# Patient Record
Sex: FEMALE | Race: BLACK OR AFRICAN AMERICAN | NOT HISPANIC OR LATINO | Employment: OTHER | URBAN - METROPOLITAN AREA
[De-identification: names, ages, dates, MRNs, and addresses within clinical notes are randomized per-mention and may not be internally consistent; named-entity substitution may affect disease eponyms.]

---

## 2023-04-11 ENCOUNTER — OFFICE VISIT (OUTPATIENT)
Dept: PRIMARY CARE | Facility: CLINIC | Age: 83
End: 2023-04-11
Payer: COMMERCIAL

## 2023-04-11 VITALS
BODY MASS INDEX: 47.75 KG/M2 | TEMPERATURE: 98.4 F | HEART RATE: 70 BPM | SYSTOLIC BLOOD PRESSURE: 130 MMHG | RESPIRATION RATE: 16 BRPM | DIASTOLIC BLOOD PRESSURE: 70 MMHG | WEIGHT: 278.2 LBS

## 2023-04-11 DIAGNOSIS — I10 PRIMARY HYPERTENSION: ICD-10-CM

## 2023-04-11 DIAGNOSIS — M1A.00X0 IDIOPATHIC CHRONIC GOUT WITHOUT TOPHUS, UNSPECIFIED SITE: ICD-10-CM

## 2023-04-11 DIAGNOSIS — C50.912 MALIGNANT NEOPLASM OF LEFT FEMALE BREAST, UNSPECIFIED ESTROGEN RECEPTOR STATUS, UNSPECIFIED SITE OF BREAST (MULTI): ICD-10-CM

## 2023-04-11 DIAGNOSIS — C73 PAPILLARY THYROID CARCINOMA (MULTI): ICD-10-CM

## 2023-04-11 DIAGNOSIS — E89.0 POSTOPERATIVE HYPOTHYROIDISM: ICD-10-CM

## 2023-04-11 DIAGNOSIS — N18.32 STAGE 3B CHRONIC KIDNEY DISEASE (MULTI): ICD-10-CM

## 2023-04-11 DIAGNOSIS — E78.5 HYPERLIPIDEMIA, UNSPECIFIED HYPERLIPIDEMIA TYPE: ICD-10-CM

## 2023-04-11 DIAGNOSIS — D32.9 MENINGIOMA (MULTI): Primary | ICD-10-CM

## 2023-04-11 PROBLEM — M17.11 PRIMARY OSTEOARTHRITIS OF RIGHT KNEE: Status: ACTIVE | Noted: 2023-04-11

## 2023-04-11 PROBLEM — M54.12 CERVICAL RADICULOPATHY: Status: ACTIVE | Noted: 2023-04-11

## 2023-04-11 PROBLEM — E53.8 VITAMIN B12 DEFICIENCY: Status: ACTIVE | Noted: 2023-04-11

## 2023-04-11 PROBLEM — M19.90 OSTEOARTHRITIS: Status: ACTIVE | Noted: 2023-04-11

## 2023-04-11 PROBLEM — G44.89 OTHER HEADACHE SYNDROME: Status: ACTIVE | Noted: 2023-04-11

## 2023-04-11 PROBLEM — M25.562 PAIN IN BOTH KNEES: Status: ACTIVE | Noted: 2023-04-11

## 2023-04-11 PROBLEM — Z85.3 PERSONAL HISTORY OF MALIGNANT NEOPLASM OF BREAST: Status: ACTIVE | Noted: 2023-04-11

## 2023-04-11 PROBLEM — R51.9 UNILATERAL HEADACHE: Status: ACTIVE | Noted: 2023-04-11

## 2023-04-11 PROBLEM — E03.9 HYPOTHYROIDISM: Status: ACTIVE | Noted: 2023-04-11

## 2023-04-11 PROBLEM — R74.8 ELEVATED ALKALINE PHOSPHATASE LEVEL: Status: ACTIVE | Noted: 2023-04-11

## 2023-04-11 PROBLEM — G47.00 INSOMNIA: Status: ACTIVE | Noted: 2023-04-11

## 2023-04-11 PROBLEM — R42 VERTIGO: Status: ACTIVE | Noted: 2023-04-11

## 2023-04-11 PROBLEM — K59.00 CONSTIPATION: Status: ACTIVE | Noted: 2023-04-11

## 2023-04-11 PROBLEM — E88.09 HYPERPROTEINEMIA: Status: ACTIVE | Noted: 2023-04-11

## 2023-04-11 PROBLEM — E55.9 VITAMIN D DEFICIENCY: Status: ACTIVE | Noted: 2023-04-11

## 2023-04-11 PROBLEM — M10.9 GOUT: Status: ACTIVE | Noted: 2023-04-11

## 2023-04-11 PROBLEM — G90.01 CAROTIDYNIA: Status: ACTIVE | Noted: 2023-04-11

## 2023-04-11 PROBLEM — M19.032 ARTHRITIS OF WRIST, LEFT: Status: ACTIVE | Noted: 2023-04-11

## 2023-04-11 PROBLEM — J30.9 ALLERGIC SINUSITIS: Status: ACTIVE | Noted: 2023-04-11

## 2023-04-11 PROBLEM — H61.21 IMPACTED CERUMEN OF RIGHT EAR: Status: ACTIVE | Noted: 2023-04-11

## 2023-04-11 PROBLEM — K76.89 LIVER CYST: Status: ACTIVE | Noted: 2023-04-11

## 2023-04-11 PROBLEM — C50.919 BREAST CANCER (MULTI): Status: ACTIVE | Noted: 2023-04-11

## 2023-04-11 PROBLEM — R04.0 EPISTAXIS, RECURRENT: Status: ACTIVE | Noted: 2023-04-11

## 2023-04-11 PROBLEM — R60.0 PEDAL EDEMA: Status: ACTIVE | Noted: 2023-04-11

## 2023-04-11 PROBLEM — M25.561 PAIN IN BOTH KNEES: Status: ACTIVE | Noted: 2023-04-11

## 2023-04-11 PROBLEM — G56.00 CARPAL TUNNEL SYNDROME: Status: ACTIVE | Noted: 2023-04-11

## 2023-04-11 PROBLEM — K76.0 FATTY LIVER: Status: ACTIVE | Noted: 2023-04-11

## 2023-04-11 PROBLEM — L30.9 ECZEMA: Status: ACTIVE | Noted: 2023-04-11

## 2023-04-11 PROBLEM — N18.30 CKD (CHRONIC KIDNEY DISEASE), STAGE III (MULTI): Status: ACTIVE | Noted: 2023-04-11

## 2023-04-11 LAB
POC HDL CHOLESTEROL: 100 MG/DL (ref 0–50)
POC LDL CHOLESTEROL: 0 MG/DL (ref 0–100)
POC NON-HDL CHOLESTEROL: 0 MG/DL (ref 0–130)
POC TOTAL CHOLESTEROL/HDL RATIO: 0 (ref 0–4.5)
POC TOTAL CHOLESTEROL: 212 MG/DL (ref 0–199)
POC TRIGLYCERIDES: 469 MG/DL (ref 0–150)

## 2023-04-11 PROCEDURE — 80061 LIPID PANEL: CPT | Performed by: INTERNAL MEDICINE

## 2023-04-11 PROCEDURE — 1036F TOBACCO NON-USER: CPT | Performed by: INTERNAL MEDICINE

## 2023-04-11 PROCEDURE — 99214 OFFICE O/P EST MOD 30 MIN: CPT | Performed by: INTERNAL MEDICINE

## 2023-04-11 PROCEDURE — 3078F DIAST BP <80 MM HG: CPT | Performed by: INTERNAL MEDICINE

## 2023-04-11 PROCEDURE — 1160F RVW MEDS BY RX/DR IN RCRD: CPT | Performed by: INTERNAL MEDICINE

## 2023-04-11 PROCEDURE — 1159F MED LIST DOCD IN RCRD: CPT | Performed by: INTERNAL MEDICINE

## 2023-04-11 PROCEDURE — 3075F SYST BP GE 130 - 139MM HG: CPT | Performed by: INTERNAL MEDICINE

## 2023-04-11 RX ORDER — AMLODIPINE BESYLATE 10 MG/1
10 TABLET ORAL DAILY
COMMUNITY
End: 2023-09-06 | Stop reason: SDUPTHER

## 2023-04-11 RX ORDER — LEVOTHYROXINE SODIUM 125 UG/1
125 TABLET ORAL DAILY
COMMUNITY
End: 2023-09-05 | Stop reason: SDUPTHER

## 2023-04-11 RX ORDER — MECLIZINE HYDROCHLORIDE 25 MG/1
25 TABLET ORAL 3 TIMES DAILY PRN
COMMUNITY
Start: 2020-02-21 | End: 2024-02-15 | Stop reason: SDUPTHER

## 2023-04-11 RX ORDER — CHOLECALCIFEROL (VITAMIN D3) 50 MCG
1 TABLET ORAL DAILY
COMMUNITY
Start: 2016-04-25

## 2023-04-11 RX ORDER — ROSUVASTATIN CALCIUM 10 MG/1
10 TABLET, COATED ORAL DAILY
COMMUNITY
End: 2023-09-05 | Stop reason: SDUPTHER

## 2023-04-11 RX ORDER — TRIAMTERENE AND HYDROCHLOROTHIAZIDE 75; 50 MG/1; MG/1
1 TABLET ORAL DAILY
COMMUNITY
End: 2023-09-05 | Stop reason: SDUPTHER

## 2023-04-11 RX ORDER — AMOXICILLIN 500 MG/1
TABLET, FILM COATED ORAL
COMMUNITY
Start: 2023-01-24 | End: 2023-11-15 | Stop reason: ALTCHOICE

## 2023-04-11 RX ORDER — ASPIRIN 81 MG/1
1 TABLET ORAL DAILY
COMMUNITY
Start: 2015-04-06 | End: 2024-04-24 | Stop reason: WASHOUT

## 2023-04-11 RX ORDER — FUROSEMIDE 20 MG/1
20 TABLET ORAL DAILY
COMMUNITY
End: 2023-09-05 | Stop reason: SDUPTHER

## 2023-04-11 ASSESSMENT — ENCOUNTER SYMPTOMS
LOSS OF SENSATION IN FEET: 0
DEPRESSION: 0
OCCASIONAL FEELINGS OF UNSTEADINESS: 0

## 2023-04-11 NOTE — PROGRESS NOTES
Ana Hightower is a 82 y.o. female   Patient with a past medical history of Breast CA, HTN, HLD, CKD III, Papillary Thyroid CA s/p Hypothyroidism, CTS, Meningioma (MRI due in 2024), Osteoarthritis, Gout, Pedal edema, FAtty Liver, Mammogram (June)     Feels fine  Using a rollator  No chest pain/  SOB/ dizziness  BM OK  Energy level ok  Appetite OK             Review of Systems     Constitutional: no fever, no chills, not feeling poorly, not feeling tired and no recent weight gain, no recent weight loss.   ENT: no earache, no hearing loss, no nosebleeds, no nasal discharge, no sore throat and no hoarseness.   Cardiovascular: the heart rate was not slow, the heart rate was not fast, no chest pain, no palpitations, no intermittent leg claudication and no lower extremity edema.   Respiratory: no cough, wheezing or shortness of breath at rest or exertion  Gastrointestinal: no abdominal pain, no constipation, no melena, no nausea, no diarrhea, no vomiting and no blood in stools.   Musculoskeletal: no arthralgias, no myalgias, no back pain, no joint swelling,  joint stiffness, no limb pain and no limb swelling.   Integumentary: no skin rashes, no skin lesions, no itching, no skin wound and no dry skin.   Neurological: no headache, no confusion, no numbness, no dizziness, no tingling and no fainting.   All other systems have been reviewed and are negative for complaint.       Vitals:    04/11/23 1203   BP: 130/70   Pulse: 70   Resp: 16   Temp: 36.9 °C (98.4 °F)        Physical Exam     Constitutional   General appearance: Alert and in no acute distress.     Pulmonary   Respiratory assessment: No respiratory distress, normal respiratory rhythm and effort.    Auscultation of Lungs: Clear bilateral breath sounds.   Cardiovascular   Auscultation of heart: Apical pulse normal, heart rate and rhythm normal, normal S1 and S2, no murmurs and no pericardial rub.    Exam for edema: 1 + peripheral edema.   Abdomen   Abdominal Exam: No  bruits, normal bowel sounds, soft, non-tender, no abdominal mass palpated.   Obese  Liver and Spleen exam: No hepato-splenomegaly.   Musculoskeletal   Examination of gait: Normal.    Inspection of digits and nails: No clubbing or cyanosis of the fingernails.    Inspection/palpation of joints, bones and muscles: No joint swelling. Normal movement of all extremities.   Skin   Skin inspection: Normal skin color and pigmentation, normal skin turgor and no visible rash.   Neurologic   Cranial nerves: Nerves 2-12 were intact, no focal neuro defects.     Assessment/Plan          Patient with a past medical history of Breast CA, HTN, HLD, CKD III, Papillary Thyroid CA s/p Hypothyroidism, CTS, Meningioma (MRI due in 2024), Osteoarthritis, Gout, Pedal edema, FAtty Liver, Mammogram (June)     # HTN  condition is stable  continue current medications        # HLD  condition is stable  continue current medications     # Hypothyroid  condition is stable  continue current medications        # Morbid Obesity/ OA/ Gout  stable  exercise please  walk 30 minutes every day  Using [employer]

## 2023-07-27 ENCOUNTER — OFFICE VISIT (OUTPATIENT)
Dept: PRIMARY CARE | Facility: CLINIC | Age: 83
End: 2023-07-27
Payer: COMMERCIAL

## 2023-07-27 VITALS
SYSTOLIC BLOOD PRESSURE: 143 MMHG | TEMPERATURE: 98.2 F | DIASTOLIC BLOOD PRESSURE: 73 MMHG | HEART RATE: 77 BPM | WEIGHT: 257.8 LBS | BODY MASS INDEX: 44.25 KG/M2

## 2023-07-27 DIAGNOSIS — I10 PRIMARY HYPERTENSION: ICD-10-CM

## 2023-07-27 DIAGNOSIS — E89.0 POSTOPERATIVE HYPOTHYROIDISM: ICD-10-CM

## 2023-07-27 DIAGNOSIS — C50.912 MALIGNANT NEOPLASM OF LEFT FEMALE BREAST, UNSPECIFIED ESTROGEN RECEPTOR STATUS, UNSPECIFIED SITE OF BREAST (MULTI): ICD-10-CM

## 2023-07-27 DIAGNOSIS — Z12.31 SCREENING MAMMOGRAM, ENCOUNTER FOR: ICD-10-CM

## 2023-07-27 DIAGNOSIS — E78.49 OTHER HYPERLIPIDEMIA: ICD-10-CM

## 2023-07-27 DIAGNOSIS — R60.0 PEDAL EDEMA: Primary | ICD-10-CM

## 2023-07-27 PROCEDURE — 99214 OFFICE O/P EST MOD 30 MIN: CPT | Performed by: INTERNAL MEDICINE

## 2023-07-27 PROCEDURE — G0439 PPPS, SUBSEQ VISIT: HCPCS | Performed by: INTERNAL MEDICINE

## 2023-07-27 PROCEDURE — 1126F AMNT PAIN NOTED NONE PRSNT: CPT | Performed by: INTERNAL MEDICINE

## 2023-07-27 PROCEDURE — 1159F MED LIST DOCD IN RCRD: CPT | Performed by: INTERNAL MEDICINE

## 2023-07-27 PROCEDURE — 1036F TOBACCO NON-USER: CPT | Performed by: INTERNAL MEDICINE

## 2023-07-27 PROCEDURE — 1160F RVW MEDS BY RX/DR IN RCRD: CPT | Performed by: INTERNAL MEDICINE

## 2023-07-27 PROCEDURE — 3078F DIAST BP <80 MM HG: CPT | Performed by: INTERNAL MEDICINE

## 2023-07-27 PROCEDURE — 3077F SYST BP >= 140 MM HG: CPT | Performed by: INTERNAL MEDICINE

## 2023-07-27 RX ORDER — LIDOCAINE 50 MG/G
PATCH TOPICAL
COMMUNITY
Start: 2023-07-04 | End: 2024-04-24 | Stop reason: WASHOUT

## 2023-07-27 RX ORDER — IBUPROFEN 600 MG/1
600 TABLET ORAL EVERY 6 HOURS PRN
COMMUNITY
Start: 2023-07-04 | End: 2024-04-24 | Stop reason: WASHOUT

## 2023-07-27 RX ORDER — DICLOFENAC SODIUM 10 MG/G
GEL TOPICAL
COMMUNITY
Start: 2023-07-04

## 2023-07-27 NOTE — PROGRESS NOTES
Ana Hightower is a 82 y.o. female here for a Medicare Wellness Exam.    No chief complaint on file.       Patient with a past medical history of Breast CA, HTN, HLD, CKD III, Papillary Thyroid CA s/p Hypothyroidism, CTS, Meningioma (MRI due in 2024), Osteoarthritis, Gout, Pedal edema, FAtty Liver, Mammogram (June)    Had a fall on wet floor  Bruised right knee    Feels fine  No chest pain/  SOB/ dizziness  BM OK  Energy level ok  Appetite OK             Medicare Wellness Exam    The patent is being seen for a follow up annual wellness visit  Past Medical, Surgical and family History: Reviewed and updated in chart  Interval History: Patient has not been hospitalized previously  Medications and Supplements: Review of all medications by a prescribing practitioner or clinical pharmacist (such as prescriptions, OTC, Herbal therapies and supplements) documented in the medical record.    Patient Self-Assessment of health: Good  Tobacco Use: No  Alcohol Use: No  Illicit drug use: No  Patient using opioids: No    Current Diet: well balanced  Adequate fluid intake: Yes  Caffeine intake: Yes  Exercise frequency: moderately active    Depression/Suicide screening: PHQ2/ PHQ9 (see screenings tab)    Hearing impairment: No  Uses hearing aids N/A  Cognitive impairment Observation: No   Patient or family reported cognitive impairment: No    Bathing: independent  Dressing: independent  Walking: independent  Taking Medications: independent  Feeding: independent  Personal Hygiene: independent  Managing Finances: independent  Shopping: independent  Housework/Basic Home Maintenance: independent  Handling transportation: dependant  Preparing meals: independent    Bowels: continent  Bladder: continent    Falls Risk: hasfallen in last 6 months.   Their fall has not resulted in an injury.   Fall risk Factors: Fall Risk Factors: Mobility Impairment and    none   Care Plan Risk: Care Plan: High Risk: Regular physical activity such as  walking, water aerobics or anton chi to improve strength, balance, coordination and flexibility. Wear appropriate, sensible shoe wear. Remove fall hazards at home such as loose rugs, obstacles, use non-slip surface in bath or shower. Keep living space well lit. Use assistive devices such as cane or walker if recommended and at home use handrails on stairs, grab bars for shower or tub, raised toilet seat and seat in the shower or tub.       Home Safety Risk Factors: Home Safety Risk Factors: Loose Rugs  Advanced Directives:  Living will: Yes POA: Yes    Patient's End of Life Decisions: Provider agree to follow.      Past Medical History:   Diagnosis Date    Encounter for screening for malignant neoplasm of colon 06/24/2016    Encounter for screening colonoscopy    Epistaxis 08/16/2019    Epistaxis, recurrent    Osteoarthritis of knee, unspecified     Osteoarthritis of knee    Other conditions influencing health status 09/12/2014    Normal colonoscopy    Pain in unspecified hand 10/27/2014    Hand pain    Personal history of malignant neoplasm of thyroid 03/31/2014    History of malignant neoplasm of thyroid    Personal history of other diseases of the digestive system 05/01/2014    History of angular cheilitis    Personal history of other diseases of the nervous system and sense organs     History of carpal tunnel syndrome    Personal history of other specified conditions 05/10/2016    History of insomnia    Trigger finger, unspecified finger 04/06/2015    Triggering of finger    Unilateral primary osteoarthritis, right knee     Primary osteoarthritis of right knee        Review of Systems     Constitutional: no fever, no chills, not feeling poorly, not feeling tired and no recent weight gain, no recent weight loss.   ENT: no earache, no hearing loss, no nosebleeds, no nasal discharge, no sore throat and no hoarseness.   Cardiovascular: the heart rate was not slow, the heart rate was not fast, no chest pain, no  "palpitations, no intermittent leg claudication  lower extremity edema.   Respiratory: no cough, wheezing or shortness of breath at rest or exertion  Gastrointestinal: no abdominal pain, no constipation, no melena, no nausea, no diarrhea, no vomiting and no blood in stools.   Musculoskeletal: no arthralgias, no myalgias, no back pain, no joint swelling,  joint stiffness,  limb pain and no limb swelling.   Integumentary: no skin rashes, no skin lesions, no itching, no skin wound and no dry skin.   Neurological: no headache, no confusion, no numbness, no dizziness, no tingling and no fainting.   All other systems have been reviewed and are negative for complaint.        1/4/2023    10:32 AM 1/10/2023    11:40 AM 1/10/2023    12:03 PM 2/9/2023     4:13 PM 3/9/2023     4:05 PM 4/11/2023    12:03 PM 7/27/2023    10:58 AM   Vitals   Systolic 103  120 146 174 130 143   Diastolic 69  70 80 71 70 73   Heart Rate 86  72 83 101 70 77   Temp      36.9 °C (98.4 °F) 36.8 °C (98.2 °F)   Resp   16  18 16    Height (in) 1.626 m (5' 4\") 1.626 m (5' 4\")  1.626 m (5' 4\") 1.626 m (5' 4\")     Weight (lb) 279 276  283 280 278.2 257.8   BMI 47.89 kg/m2 47.38 kg/m2  48.58 kg/m2 48.06 kg/m2 47.75 kg/m2 44.25 kg/m2   BSA (m2) 2.39 m2 2.38 m2  2.4 m2 2.39 m2 2.39 m2 2.3 m2   Visit Report      Report Report       Physical Exam     Constitutional   General appearance: Alert and in no acute distress.     Pulmonary   Respiratory assessment: No respiratory distress, normal respiratory rhythm and effort.    Auscultation of Lungs: Clear bilateral breath sounds.   Cardiovascular   Auscultation of heart: Apical pulse normal, heart rate and rhythm normal, normal S1 and S2, no murmurs and no pericardial rub.    Exam for edema:  peripheral edema.   Abdomen   Abdominal Exam: No bruits, normal bowel sounds, soft, non-tender, no abdominal mass palpated.    Liver and Spleen exam: No hepato-splenomegaly.   Musculoskeletal   Examination of gait: " Rollator  Inspection of digits and nails: No clubbing or cyanosis of the fingernails.    Inspection/palpation of joints, bones and muscles: No joint swelling. Normal movement of all extremities.   Skin   Skin inspection: Normal skin color and pigmentation, normal skin turgor and no visible rash.   Neurologic   Cranial nerves: Nerves 2-12 were intact, no focal neuro defects.         No results found for requested labs within last 365 days.       Assessment/Plan            Patient with a past medical history of Breast CA, HTN, HLD, CKD III, Papillary Thyroid CA s/p Hypothyroidism, CTS, Meningioma (MRI due in 2024), Osteoarthritis, Gout, Pedal edema, FAtty Liver, Mammogram (June)     # HTN  condition is stable  continue current medications     # Pedal edema  Compression stockings Rx     # HLD  condition is stable  continue current medications     # Hypothyroid  condition is stable  continue current medications        # Morbid Obesity/ OA/ Gout  Had a fall. No injuries thankfully  Recommend Life Alert system  walk 30 minutes every day  Using [email or web address]

## 2023-08-10 ENCOUNTER — TELEPHONE (OUTPATIENT)
Dept: PRIMARY CARE | Facility: CLINIC | Age: 83
End: 2023-08-10
Payer: COMMERCIAL

## 2023-08-10 DIAGNOSIS — R60.9 EDEMA, UNSPECIFIED TYPE: ICD-10-CM

## 2023-08-30 ENCOUNTER — OFFICE VISIT (OUTPATIENT)
Dept: PRIMARY CARE | Facility: CLINIC | Age: 83
End: 2023-08-30
Payer: COMMERCIAL

## 2023-08-30 VITALS
BODY MASS INDEX: 47.72 KG/M2 | SYSTOLIC BLOOD PRESSURE: 130 MMHG | TEMPERATURE: 97.5 F | WEIGHT: 278 LBS | HEART RATE: 72 BPM | RESPIRATION RATE: 16 BRPM | DIASTOLIC BLOOD PRESSURE: 80 MMHG

## 2023-08-30 DIAGNOSIS — R31.9 HEMATURIA, UNSPECIFIED TYPE: ICD-10-CM

## 2023-08-30 DIAGNOSIS — M17.0 PRIMARY OSTEOARTHRITIS OF BOTH KNEES: Primary | ICD-10-CM

## 2023-08-30 PROBLEM — S80.10XA CONTUSION OF KNEE AND LOWER LEG: Status: ACTIVE | Noted: 2023-08-30

## 2023-08-30 PROBLEM — L24.9 IRRITANT CONTACT DERMATITIS, UNSPECIFIED CAUSE: Status: ACTIVE | Noted: 2018-09-05

## 2023-08-30 PROBLEM — S80.00XA CONTUSION OF KNEE AND LOWER LEG: Status: ACTIVE | Noted: 2023-08-30

## 2023-08-30 LAB
POC APPEARANCE, URINE: CLEAR
POC BILIRUBIN, URINE: ABNORMAL
POC BLOOD, URINE: NEGATIVE
POC COLOR, URINE: ABNORMAL
POC GLUCOSE, URINE: NEGATIVE MG/DL
POC KETONES, URINE: NEGATIVE MG/DL
POC LEUKOCYTES, URINE: NEGATIVE
POC NITRITE,URINE: NEGATIVE
POC PH, URINE: 5.5 PH
POC PROTEIN, URINE: ABNORMAL MG/DL
POC SPECIFIC GRAVITY, URINE: 1.02
POC UROBILINOGEN, URINE: 0.2 EU/DL

## 2023-08-30 PROCEDURE — 1126F AMNT PAIN NOTED NONE PRSNT: CPT | Performed by: INTERNAL MEDICINE

## 2023-08-30 PROCEDURE — 1036F TOBACCO NON-USER: CPT | Performed by: INTERNAL MEDICINE

## 2023-08-30 PROCEDURE — 81003 URINALYSIS AUTO W/O SCOPE: CPT | Performed by: INTERNAL MEDICINE

## 2023-08-30 PROCEDURE — 1160F RVW MEDS BY RX/DR IN RCRD: CPT | Performed by: INTERNAL MEDICINE

## 2023-08-30 PROCEDURE — 3075F SYST BP GE 130 - 139MM HG: CPT | Performed by: INTERNAL MEDICINE

## 2023-08-30 PROCEDURE — 99213 OFFICE O/P EST LOW 20 MIN: CPT | Performed by: INTERNAL MEDICINE

## 2023-08-30 PROCEDURE — 3079F DIAST BP 80-89 MM HG: CPT | Performed by: INTERNAL MEDICINE

## 2023-08-30 PROCEDURE — 1159F MED LIST DOCD IN RCRD: CPT | Performed by: INTERNAL MEDICINE

## 2023-08-30 RX ORDER — TRIAMCINOLONE ACETONIDE 0.25 MG/G
OINTMENT TOPICAL
COMMUNITY
Start: 2018-03-14 | End: 2024-04-24 | Stop reason: WASHOUT

## 2023-08-30 RX ORDER — PIMECROLIMUS 10 MG/G
CREAM TOPICAL
COMMUNITY
Start: 2018-08-29 | End: 2024-04-24 | Stop reason: WASHOUT

## 2023-08-30 NOTE — PROGRESS NOTES
Ana Hightower is a 82 y.o. female     Patient with a past medical history of Breast CA, HTN, HLD, CKD III, Papillary Thyroid CA s/p Hypothyroidism, CTS, Meningioma (MRI due in 2024), Osteoarthritis, Gout, Pedal edema, FAtty Liver, Mammogram (June)     Saw blood in urine 2 weeks ago  some burning  No fever or chills              Review of Systems     Constitutional: no fever, no chills, not feeling poorly, not feeling tired and no recent weight gain, no recent weight loss.   ENT: no earache, no hearing loss, no nosebleeds, no nasal discharge, no sore throat and no hoarseness.   Cardiovascular: the heart rate was not slow, the heart rate was not fast, no chest pain, no palpitations, no intermittent leg claudication and no lower extremity edema.   Respiratory: no cough, wheezing or shortness of breath at rest or exertion  Gastrointestinal: no abdominal pain, no constipation, no melena, no nausea, no diarrhea, no vomiting and no blood in stools.   Musculoskeletal: no arthralgias, no myalgias, no back pain, no joint swelling, no joint stiffness, no limb pain and no limb swelling.   Integumentary: no skin rashes, no skin lesions, no itching, no skin wound and no dry skin.   Neurological: no headache, no confusion, no numbness, no dizziness, no tingling and no fainting.   All other systems have been reviewed and are negative for complaint.       Vitals:    08/30/23 1002   BP: 130/80   Pulse: 72   Resp: 16   Temp: 36.4 °C (97.5 °F)        Physical Exam     Constitutional   General appearance: Alert and in no acute distress.     Pulmonary   Respiratory assessment: No respiratory distress, normal respiratory rhythm and effort.    Auscultation of Lungs: Clear bilateral breath sounds.   Cardiovascular   Auscultation of heart: Apical pulse normal, heart rate and rhythm normal, normal S1 and S2, no murmurs and no pericardial rub.    Exam for edema: No peripheral edema.   Abdomen   Abdominal Exam: No bruits, normal bowel sounds,  soft, non-tender, no abdominal mass palpated.    Liver and Spleen exam: No hepato-splenomegaly.   Musculoskeletal   Examination of gait: Normal.    Inspection of digits and nails: No clubbing or cyanosis of the fingernails.    Inspection/palpation of joints, bones and muscles: No joint swelling. Normal movement of all extremities.   Skin   Skin inspection: Normal skin color and pigmentation, normal skin turgor and no visible rash.   Neurologic   Cranial nerves: Nerves 2-12 were intact, no focal neuro defects.     Assessment/Plan            Patient with a past medical history of Breast CA, HTN, HLD, CKD III, Papillary Thyroid CA s/p Hypothyroidism, CTS, Meningioma (MRI due in 2024), Osteoarthritis, Gout, Pedal edema, FAtty Liver, Mammogram (June)     # UTI/ Hematuria   Resolved  Urine Cx from ED had shown no growth  If recurs, please call    # HTN  Stable  Continue current medications    # OA of knees  Rx for walker

## 2023-09-01 DIAGNOSIS — E89.0 POSTOPERATIVE HYPOTHYROIDISM: ICD-10-CM

## 2023-09-01 DIAGNOSIS — I10 PRIMARY HYPERTENSION: ICD-10-CM

## 2023-09-01 DIAGNOSIS — E78.49 OTHER HYPERLIPIDEMIA: ICD-10-CM

## 2023-09-05 RX ORDER — TRIAMTERENE AND HYDROCHLOROTHIAZIDE 75; 50 MG/1; MG/1
1 TABLET ORAL DAILY
Qty: 90 TABLET | Refills: 0 | Status: SHIPPED | OUTPATIENT
Start: 2023-09-05

## 2023-09-05 RX ORDER — FUROSEMIDE 20 MG/1
20 TABLET ORAL DAILY
Qty: 90 TABLET | Refills: 0 | Status: SHIPPED | OUTPATIENT
Start: 2023-09-05

## 2023-09-05 RX ORDER — ROSUVASTATIN CALCIUM 10 MG/1
10 TABLET, COATED ORAL DAILY
Qty: 90 TABLET | Refills: 0 | Status: SHIPPED | OUTPATIENT
Start: 2023-09-05

## 2023-09-05 RX ORDER — LEVOTHYROXINE SODIUM 125 UG/1
125 TABLET ORAL DAILY
Qty: 90 TABLET | Refills: 0 | Status: SHIPPED | OUTPATIENT
Start: 2023-09-05

## 2023-09-06 DIAGNOSIS — I10 PRIMARY HYPERTENSION: ICD-10-CM

## 2023-09-06 RX ORDER — AMLODIPINE BESYLATE 10 MG/1
10 TABLET ORAL DAILY
Qty: 90 TABLET | Refills: 0 | Status: SHIPPED | OUTPATIENT
Start: 2023-09-06

## 2023-09-08 ENCOUNTER — APPOINTMENT (OUTPATIENT)
Dept: PRIMARY CARE | Facility: CLINIC | Age: 83
End: 2023-09-08
Payer: COMMERCIAL

## 2023-09-13 ENCOUNTER — TELEPHONE (OUTPATIENT)
Dept: PRIMARY CARE | Facility: CLINIC | Age: 83
End: 2023-09-13

## 2023-09-13 NOTE — TELEPHONE ENCOUNTER
PT called stating that Dr Toussaint had ordered a rolling walker. Dr Toussaint needs to make it more specific, she wants one with a seat.

## 2023-09-15 DIAGNOSIS — M54.12 CERVICAL RADICULOPATHY: Primary | ICD-10-CM

## 2023-09-22 NOTE — TELEPHONE ENCOUNTER
PT called in and stated that she wants a roller walker with a seat. PT also stated that Dr. Toussaint needs to rewrite the script  to state roller walker with seat and have it faxed to [facility]

## 2023-10-12 ENCOUNTER — APPOINTMENT (OUTPATIENT)
Dept: NEUROSURGERY | Facility: CLINIC | Age: 83
End: 2023-10-12
Payer: COMMERCIAL

## 2023-10-23 ENCOUNTER — APPOINTMENT (OUTPATIENT)
Dept: NEUROLOGY | Facility: CLINIC | Age: 83
End: 2023-10-23
Payer: COMMERCIAL

## 2023-10-23 NOTE — PROGRESS NOTES
Date of Service: 10/23/2023  Patient: Ana Hightower  MRN: [id number]  Referring Provider: No ref. provider found  PCP: Og Toussaint MD    History of Present Illness:   Ms. Hightower is a 82 y.o. female who presents to neurology clinic for evaluation of headaches. Ana Hightower's past medical history is pertinent for hypertension, dyslipidemia, hypothyroidism, left breast cancer s/p surgery, left petrous apex meningioma. The patient was previously seen by Dr. Narciso Houser since 2013.    Per Dr. Houser's documentation, she initially present with left hemicranial headaches of several years. MRI brain reviewed a dural based enhancing mass involving the left petrous apex. She was evaluated by neuro-onc, ophthalmology and neurosurgery. Biopsy was considered but after further imaging conservative follow up was recommended. She was recently evaluated again by neurosurgery with minimal increase in meningioma size. Neurosurgery recommended 1 year follow up MRI.    Review of Systems:  The systems were reviewed with pertinent positives and negatives documented in the HPI.      Past Medical & Surgical History  Past Medical History:   Diagnosis Date    Encounter for screening for malignant neoplasm of colon 06/24/2016    Encounter for screening colonoscopy    Epistaxis 08/16/2019    Epistaxis, recurrent    Osteoarthritis of knee, unspecified     Osteoarthritis of knee    Other conditions influencing health status 09/12/2014    Normal colonoscopy    Pain in unspecified hand 10/27/2014    Hand pain    Personal history of malignant neoplasm of thyroid 03/31/2014    History of malignant neoplasm of thyroid    Personal history of other diseases of the digestive system 05/01/2014    History of angular cheilitis    Personal history of other diseases of the nervous system and sense organs     History of carpal tunnel syndrome    Personal history of other specified conditions 05/10/2016    History of insomnia    Trigger finger,  unspecified finger 04/06/2015    Triggering of finger    Unilateral primary osteoarthritis, right knee     Primary osteoarthritis of right knee     Past Surgical History:   Procedure Laterality Date    CARPAL TUNNEL RELEASE  04/06/2015    Neuroplasty Decompression Median Nerve At Carpal Tunnel    HAND TENDON SURGERY  04/06/2015    Hand Incision Tendon Sheath Of A Finger    KNEE SURGERY  07/30/2013    Knee Surgery    OTHER SURGICAL HISTORY  03/31/2014    Left Breast Partial Mastectomy    SENTINEL LYMPH NODE BIOPSY  03/31/2014    [place] Lymph Node Biopsy    TOTAL ABDOMINAL HYSTERECTOMY  03/31/2014    Total Abdominal Hysterectomy    TOTAL THYROIDECTOMY  07/30/2013    Thyroid Surgery Total Thyroidectomy       Social History:   Social History     Tobacco Use    Smoking status: Never     Passive exposure: Never    Smokeless tobacco: Never   Substance Use Topics    Alcohol use: Never        Family History:   @FHX@     Medications:     Current Outpatient Medications:     amLODIPine (Norvasc) 10 mg tablet, Take 1 tablet (10 mg) by mouth once daily., Disp: 90 tablet, Rfl: 0    amoxicillin (Amoxil) 500 mg tablet, TAKE 4 TABLETS BY MOUTH 1 HOUR PRIOR TO DENTAL VISIT, Disp: , Rfl:     aspirin 81 mg EC tablet, Take 1 tablet (81 mg) by mouth once daily., Disp: , Rfl:     cholecalciferol (Vitamin D-3) 50 MCG (2000 UT) tablet, Take 1 tablet (50 mcg) by mouth once daily., Disp: , Rfl:     diclofenac sodium (Voltaren) 1 % gel gel, APPLY TOPICALLY TO THE AFFECTED AREA AS NEEDED FOR PAIN, Disp: , Rfl:     furosemide (Lasix) 20 mg tablet, Take 1 tablet (20 mg) by mouth once daily., Disp: 90 tablet, Rfl: 0    ibuprofen 600 mg tablet, Take 1 tablet (600 mg) by mouth every 6 hours if needed., Disp: , Rfl:     levothyroxine (Synthroid, Levoxyl) 125 mcg tablet, Take 1 tablet (125 mcg) by mouth once daily., Disp: 90 tablet, Rfl: 0    lidocaine (Lidoderm) 5 % patch, Apply topically., Disp: , Rfl:     meclizine (Antivert) 25 mg tablet, Take 1  tablet (25 mg) by mouth 3 times a day as needed., Disp: , Rfl:     pimecrolimus (Elidel) 1 % cream, 1 Application, Disp: , Rfl:     rosuvastatin (Crestor) 10 mg tablet, Take 1 tablet (10 mg) by mouth once daily., Disp: 90 tablet, Rfl: 0    triamcinolone (Kenalog) 0.025 % ointment, 1 Application, Disp: , Rfl:     triamterene-hydrochlorothiazid (Maxzide) 75-50 mg tablet, Take 1 tablet by mouth once daily., Disp: 90 tablet, Rfl: 0     General Physical Exam:  There were no vitals taken for this visit.     She looks well and is not in any acute distress. Breathing comfortably on room air.     Neurological Exam:   Mental status reveals her to be alert and oriented to person, place, and date. Speech is intact to conversation. Fund of knowledge is normal.     Cranial nerves:  CN 2   Visual fields full to confrontation.   CN 3, 4, 6   Pupils round, 4 mm in diameter, equally reactive to light. Lids symmetric; no ptosis. EOMs normal alignment, full range.   No nystagmus.   CN 5   Facial sensation intact bilaterally.   CN 7   Normal and symmetric facial strength. Nasolabial folds symmetric.   CN 8   Hearing intact to conversation.   CN 9   Palate elevates symmetrically.   CN 11   Normal strength of shoulder shrug and neck turning.   CN 12   Tongue midline, with normal bulk and strength; no fasciculations.      Motor:  Muscle bulk: Normal throughout.  Muscle tone: Normal in both upper and lower extremities.  Movements: No fasciculations, tremors or other abnormal movement.    Neck Flexion 5  Neck Extension 5    R L   5 5 Shoulder abduction  5 5 Elbow flexion  5 5 Elbow extension  5 5 Finger flexion  5 5 Finger extension  5 5 Finger abduction  5 5 Hip flexion  5 5 Hip extension  5 5 Hip abduction (with hip flexed)  5 5 Knee flexion  5 5 Knee extension  5 5 Ankle dorsiflexion  5 5 Ankle plantarflexion  5 5 Big toe extension  5 5 Toe flexion     Reflexes                         R     L  Tricpes          2      2  Biceps           2  "     2  Brachiorad    2      2  Burnett       neg  neg  Patellar         2      2   Achilles         2      2    Babinski: toes downgoing to plantar stimulation. No clonus or other pathologic reflexes present.      Sensory:   Light Touch: normal  Pin: normal   Position: normal  Vibration: normal  Temperature: Normal     Coordination:  In both upper extremities, finger-nose-finger was intact without dysmetria or overshoot.   In both lower extremities, heel-to-shin was intact. SILVIO were intact in both upper and lower extremities.      Gait:  Station was stable with a normal base. Gait was stable with a normal arm swing and speed. No ataxia, shuffling, steppage or waddling was present. No circumduction was present. Tandem gait was intact. No Romberg sign was present.    Results:  No results found for: \"HGBA1C\"  No results found for: \"CCKSNOKE75\"  VITAMIN D: No components found for: \"D25OHT\"  COPPER: No results found for: \"COPPER\"  ZINC: No components found for: \"ZINCLEVEL\"  B6: ***  FOLIC ACID: No components found for: \"FOLATELEVEL\"  IRON STUDIES: No results found for: \"IRON\", \"TIBC\", \"FERRITIN\"  MAGNESIUM: No components found for: \"MAGNESIUM\"  No components found for: \"THRYOIDSTIMU\"  No results found for: \"KIERRA\", \"ANATITER\"  No results found for: \"CKTOTAL\"    Lab Results   Component Value Date    SPEP ABNORMAL 03/07/2018     CBC:   Lab Results   Component Value Date    WBC 7.8 08/22/2023    HGB 12.0 08/22/2023    HCT 38.2 08/22/2023     08/22/2023     BMP:   Lab Results   Component Value Date     08/22/2023    K 4.0 08/22/2023     08/22/2023    CO2 33 (H) 08/22/2023    BUN 33 (H) 08/22/2023    CREATININE 1.04 08/22/2023    CALCIUM 9.3 08/22/2023     LFT:   Lab Results   Component Value Date    ALKPHOS 114 08/22/2023    BILITOT 0.3 08/22/2023    PROT 7.6 08/22/2023    ALBUMIN 3.9 08/22/2023    ALT 13 08/22/2023    AST 19 08/22/2023       Imaging:  {Imaging Results " (Optional):[id number]}    Impression:  Ana Hightower is a 82 y.o. who presents with ***.    Plan:  -     Reviewed and approved by YADIRA GOTTLIEB on 10/23/23 at 9:11 AM.    I personally spent *** minutes on the day of the visit completing the review of the medical record and outside records, obtaining history and performing an appropriate physical exam, patient care, counseling and education, placing orders, independently reviewing results, communicating with the patient/family and other providers, coordinating care and performing appropriate clinical documentation.

## 2023-10-24 ENCOUNTER — OFFICE VISIT (OUTPATIENT)
Dept: SURGICAL ONCOLOGY | Facility: HOSPITAL | Age: 83
End: 2023-10-24
Payer: COMMERCIAL

## 2023-10-24 VITALS
HEART RATE: 99 BPM | SYSTOLIC BLOOD PRESSURE: 146 MMHG | BODY MASS INDEX: 47 KG/M2 | DIASTOLIC BLOOD PRESSURE: 75 MMHG | TEMPERATURE: 96.6 F | WEIGHT: 273.8 LBS

## 2023-10-24 DIAGNOSIS — C50.212 MALIGNANT NEOPLASM OF UPPER-INNER QUADRANT OF LEFT BREAST IN FEMALE, ESTROGEN RECEPTOR POSITIVE (MULTI): ICD-10-CM

## 2023-10-24 DIAGNOSIS — Z08 ENCOUNTER FOR FOLLOW-UP SURVEILLANCE OF BREAST CANCER: ICD-10-CM

## 2023-10-24 DIAGNOSIS — M17.11 PRIMARY OSTEOARTHRITIS OF RIGHT KNEE: ICD-10-CM

## 2023-10-24 DIAGNOSIS — D32.9 MENINGIOMA (MULTI): ICD-10-CM

## 2023-10-24 DIAGNOSIS — Z85.3 PERSONAL HISTORY OF BREAST CANCER: Primary | ICD-10-CM

## 2023-10-24 DIAGNOSIS — Z92.3 S/P RADIATION THERAPY: ICD-10-CM

## 2023-10-24 DIAGNOSIS — Z85.3 ENCOUNTER FOR FOLLOW-UP SURVEILLANCE OF BREAST CANCER: ICD-10-CM

## 2023-10-24 DIAGNOSIS — Z90.12 STATUS POST PARTIAL MASTECTOMY OF LEFT BREAST: ICD-10-CM

## 2023-10-24 DIAGNOSIS — C73 PAPILLARY THYROID CARCINOMA (MULTI): ICD-10-CM

## 2023-10-24 DIAGNOSIS — Z17.0 MALIGNANT NEOPLASM OF UPPER-INNER QUADRANT OF LEFT BREAST IN FEMALE, ESTROGEN RECEPTOR POSITIVE (MULTI): ICD-10-CM

## 2023-10-24 PROCEDURE — 1126F AMNT PAIN NOTED NONE PRSNT: CPT | Performed by: SURGERY

## 2023-10-24 PROCEDURE — 3077F SYST BP >= 140 MM HG: CPT | Performed by: SURGERY

## 2023-10-24 PROCEDURE — 3078F DIAST BP <80 MM HG: CPT | Performed by: SURGERY

## 2023-10-24 PROCEDURE — 99212 OFFICE O/P EST SF 10 MIN: CPT | Performed by: SURGERY

## 2023-10-24 PROCEDURE — 1160F RVW MEDS BY RX/DR IN RCRD: CPT | Performed by: SURGERY

## 2023-10-24 PROCEDURE — 1159F MED LIST DOCD IN RCRD: CPT | Performed by: SURGERY

## 2023-10-24 PROCEDURE — 1036F TOBACCO NON-USER: CPT | Performed by: SURGERY

## 2023-10-24 ASSESSMENT — ENCOUNTER SYMPTOMS
RESPIRATORY NEGATIVE: 1
ALLERGIC/IMMUNOLOGIC NEGATIVE: 1
CONSTITUTIONAL NEGATIVE: 1
EYES NEGATIVE: 1
NECK PAIN: 1
PSYCHIATRIC NEGATIVE: 1
CARDIOVASCULAR NEGATIVE: 1
HEADACHES: 1
GASTROINTESTINAL NEGATIVE: 1
HEMATOLOGIC/LYMPHATIC NEGATIVE: 1
ENDOCRINE NEGATIVE: 1

## 2023-10-24 NOTE — PROGRESS NOTES
Subjective   Patient ID: Ana Hightower is a 82 y.o. female presenting for breast cancer surveillance.    HPI  Mrs. Hightower is a 82-year-old -American woman who was diagnosed with left breast cancer in . She underwent left partial mastectomy with sentinel node biopsy for a 0.6 cm infiltrating ductal cancer with 5 negative lymph nodes. Estrogen receptors were positive only at 3% and progesterone receptors were negative, ER-2/bia was negative. She completed radiation in 2010. She refused chemotherapy. She has been followed for many years in breast cancer surveillance with Jazmin Esteban CNP.      2022 : The patient had not noted any breast masses or nipple discharge. She has not noted any swelling of her arm.  Exam and mammogram: Mammogram 2022 BI-RADS Category 2     23 bilateral mammogram BI-RADS category 2.      She has no breast related complaints today. Taking all of her medications and trying stay active.      FEMALE HISTORY: Onset of menses was at age 9 and onset of menopause in her late 40s. She is  2 para 2 with her first child born she was 18; she did not breast-feed. She took hormone replacement for approximately 4 years.     FAMILY HISTORY: The only family history of breast cancer is in a niece diagnosed in her 60s  at 66. The patient has an older sister who had ovarian cancer diagnosed at 87  at 87.   A son with rectal cancer, diagnosed at 55   Father with lung cancer  Brother with colon cancer diagnosed in his late 50s  at 60    The patient herself has had thyroid cancer; hypertension; vitamin D deficiency; dyslipidemia    Past Medical History:   Diagnosis Date    Encounter for screening for malignant neoplasm of colon 2016    Encounter for screening colonoscopy    Epistaxis 2019    Epistaxis, recurrent    Osteoarthritis of knee, unspecified     Osteoarthritis of knee    Other conditions influencing health status 2014     Normal colonoscopy    Pain in unspecified hand 10/27/2014    Hand pain    Personal history of malignant neoplasm of thyroid 03/31/2014    History of malignant neoplasm of thyroid    Personal history of other diseases of the digestive system 05/01/2014    History of angular cheilitis    Personal history of other diseases of the nervous system and sense organs     History of carpal tunnel syndrome    Personal history of other specified conditions 05/10/2016    History of insomnia    Trigger finger, unspecified finger 04/06/2015    Triggering of finger    Unilateral primary osteoarthritis, right knee     Primary osteoarthritis of right knee     Current Outpatient Medications on File Prior to Visit   Medication Sig Dispense Refill    aspirin 81 mg EC tablet Take 1 tablet (81 mg) by mouth once daily.      cholecalciferol (Vitamin D-3) 50 MCG (2000 UT) tablet Take 1 tablet (2,000 Units) by mouth once daily.      diclofenac sodium (Voltaren) 1 % gel gel APPLY TOPICALLY TO THE AFFECTED AREA AS NEEDED FOR PAIN      furosemide (Lasix) 20 mg tablet Take 1 tablet (20 mg) by mouth once daily. 90 tablet 0    ibuprofen 600 mg tablet Take 1 tablet (600 mg) by mouth every 6 hours if needed.      levothyroxine (Synthroid, Levoxyl) 125 mcg tablet Take 1 tablet (125 mcg) by mouth once daily. 90 tablet 0    lidocaine (Lidoderm) 5 % patch Apply topically.      meclizine (Antivert) 25 mg tablet Take 1 tablet (25 mg) by mouth 3 times a day as needed.      pimecrolimus (Elidel) 1 % cream 1 Application      rosuvastatin (Crestor) 10 mg tablet Take 1 tablet (10 mg) by mouth once daily. 90 tablet 0    triamcinolone (Kenalog) 0.025 % ointment 1 Application      triamterene-hydrochlorothiazid (Maxzide) 75-50 mg tablet Take 1 tablet by mouth once daily. 90 tablet 0    amLODIPine (Norvasc) 10 mg tablet Take 1 tablet (10 mg) by mouth once daily. 90 tablet 0    amoxicillin (Amoxil) 500 mg tablet TAKE 4 TABLETS BY MOUTH 1 HOUR PRIOR TO DENTAL  VISIT       No current facility-administered medications on file prior to visit.          Review of Systems   Constitutional: Negative.    HENT: Negative.     Eyes: Negative.    Respiratory: Negative.     Cardiovascular: Negative.    Gastrointestinal: Negative.    Endocrine: Negative.    Genitourinary: Negative.    Musculoskeletal:  Positive for neck pain.        Joint stiffness   Skin: Negative.    Allergic/Immunologic: Negative.    Neurological:  Positive for headaches.   Hematological: Negative.    Psychiatric/Behavioral: Negative.     All other systems reviewed and are negative.      Objective   Physical Exam  General: Otherwise healthy appearing. No acute distress.     HEENT: Conjunctiva well-colored, sclera non-icteric. Neck supple, trachea midline. No lymphadenopathy or thyromegaly.     Cardiovascular: Regular rate and rhythm.    Respiratory: Clear to auscultation bilaterally.     Breast: Exam performed in the sitting and supine positions. Right breast: No obvious abnormalities palpable. No skin or nipple changes. Left breast: Well-healed curvilinear incision 10:00, 12 cm FN with underlying scar tissue, moderate radiation skin changes. Well-healed axillary incision. No obvious abnormalities palpable. No nipple changes.     Abdomen: Soft, non-tender, non-distended.    Extremities: Atraumatic, no edema.     Neurologic: Motor and sensory grossly intact. Alert and oriented.     Lymphatic: No axillary, supraclavicular, or infraclavicular lymphadenopathy bilaterally.       Assessment/Plan   Today we reviewed your pertinent history, physical exam, imaging, pathology, and treatment for breast cancer. Your clinical exam today and imaging August 2023  show no worrisome findings. You are [facility]- [facility]!    All questions were answered in detail, and we are in agreement with the following plan:   1. Please continue yearly mammograms with your PCP.  Next due in August 2024.     If you have any questions, concerns, or changes in your breast self-exam prior to our next visit, please call my office at the number below.       Vicky Houser MD   Breast Surgical Oncology Department of Surgery   [facility]   Office: [phone] (option #2)   Fax: [phone]     DISCLAIMER: Speech recognition software was used to create portions of this document. While an attempt at proofreading has been made, minor errors in transcription may be present.    Diagnoses and all orders for this visit:  Personal history of breast cancer  Malignant neoplasm of upper-inner quadrant of left breast in female, estrogen receptor positive (CMS/HCC)  Encounter for follow-up surveillance of breast cancer  Status post partial mastectomy of left breast  S/P radiation therapy  Papillary thyroid carcinoma (CMS/HCC)  Meningioma (CMS/HCC)  Primary osteoarthritis of right knee

## 2023-11-01 ENCOUNTER — OFFICE VISIT (OUTPATIENT)
Dept: PRIMARY CARE | Facility: CLINIC | Age: 83
End: 2023-11-01
Payer: COMMERCIAL

## 2023-11-01 VITALS
BODY MASS INDEX: 46.93 KG/M2 | RESPIRATION RATE: 16 BRPM | DIASTOLIC BLOOD PRESSURE: 70 MMHG | SYSTOLIC BLOOD PRESSURE: 130 MMHG | TEMPERATURE: 98.2 F | HEART RATE: 70 BPM | WEIGHT: 273.4 LBS

## 2023-11-01 DIAGNOSIS — E89.0 POSTOPERATIVE HYPOTHYROIDISM: ICD-10-CM

## 2023-11-01 DIAGNOSIS — I10 PRIMARY HYPERTENSION: ICD-10-CM

## 2023-11-01 DIAGNOSIS — E78.49 OTHER HYPERLIPIDEMIA: Primary | ICD-10-CM

## 2023-11-01 DIAGNOSIS — Z28.21 REFUSED INFLUENZA VACCINE: ICD-10-CM

## 2023-11-01 PROCEDURE — 1126F AMNT PAIN NOTED NONE PRSNT: CPT | Performed by: INTERNAL MEDICINE

## 2023-11-01 PROCEDURE — 99214 OFFICE O/P EST MOD 30 MIN: CPT | Performed by: INTERNAL MEDICINE

## 2023-11-01 PROCEDURE — 3078F DIAST BP <80 MM HG: CPT | Performed by: INTERNAL MEDICINE

## 2023-11-01 PROCEDURE — 1159F MED LIST DOCD IN RCRD: CPT | Performed by: INTERNAL MEDICINE

## 2023-11-01 PROCEDURE — 3075F SYST BP GE 130 - 139MM HG: CPT | Performed by: INTERNAL MEDICINE

## 2023-11-01 PROCEDURE — 1036F TOBACCO NON-USER: CPT | Performed by: INTERNAL MEDICINE

## 2023-11-01 PROCEDURE — 1160F RVW MEDS BY RX/DR IN RCRD: CPT | Performed by: INTERNAL MEDICINE

## 2023-11-01 NOTE — PROGRESS NOTES
Ana Hightower is a 82 y.o. female   Patient with a past medical history of Breast CA, HTN, HLD, CKD III, Papillary Thyroid CA s/p Hypothyroidism, CTS, Meningioma (MRI due in 2024), Osteoarthritis, Gout, Pedal edema, FAtty Liver, Mammogram (June)       Has been getting more headaches at night  Starts in neck and goes along right side of face  Will see Neurology      No chest pain/  SOB/ dizziness  BM OK  Energy level ok  Appetite OK             Review of Systems     Constitutional: no fever, no chills, not feeling poorly, not feeling tired and no recent weight gain, no recent weight loss.   ENT: no earache, no hearing loss, no nosebleeds, no nasal discharge, no sore throat and no hoarseness.   Cardiovascular: the heart rate was not slow, the heart rate was not fast, no chest pain, no palpitations, no intermittent leg claudication and no lower extremity edema.   Respiratory: no cough, wheezing or shortness of breath at rest or exertion  Gastrointestinal: no abdominal pain, no constipation, no melena, no nausea, no diarrhea, no vomiting and no blood in stools.   Musculoskeletal: no arthralgias, no myalgias, no back pain, no joint swelling, no joint stiffness, no limb pain and no limb swelling.   Integumentary: no skin rashes, no skin lesions, no itching, no skin wound and no dry skin.   Neurological: no confusion, no numbness, no dizziness, no tingling and no fainting.   All other systems have been reviewed and are negative for complaint.       Vitals:    11/01/23 1102   BP: 130/70   Pulse: 70   Resp: 16   Temp: 36.8 °C (98.2 °F)        Physical Exam     Constitutional   General appearance: Alert and in no acute distress.     Pulmonary   Respiratory assessment: No respiratory distress, normal respiratory rhythm and effort.    Auscultation of Lungs: Clear bilateral breath sounds.   Cardiovascular   Auscultation of heart: Apical pulse normal, heart rate and rhythm normal, normal S1 and S2, no murmurs and no pericardial  rub.    Exam for edema: No peripheral edema.   Abdomen   Abdominal Exam: No bruits, normal bowel sounds, soft, non-tender, no abdominal mass palpated.    Liver and Spleen exam: No hepato-splenomegaly.   Musculoskeletal   Examination of gait: Normal.    Inspection of digits and nails: No clubbing or cyanosis of the fingernails.    Inspection/palpation of joints, bones and muscles: No joint swelling. Normal movement of all extremities.   Skin   Skin inspection: Normal skin color and pigmentation, normal skin turgor and no visible rash.   Neurologic   Cranial nerves: Nerves 2-12 were intact, no focal neuro defects.     Assessment/Plan            Patient with a past medical history of Breast CA, HTN, HLD, CKD III, Papillary Thyroid CA s/p Hypothyroidism, CTS, Meningioma (MRI due in 2024), Osteoarthritis, Gout, Pedal edema, FAtty Liver, Mammogram (June)     # HLD  Stable  Continue current medications  Watch salt, avoid excessive caffeine  Avoid processed meats/ sugars/juices Instead eat fresh fruit  Add walnuts and almonds to your diet  exercise 6 days a week for 30 minutes      # HTN/ CKD III  Stable  Continue current medications    # OA of knees  Rx for walker    # Hypothyroidism  Check TSH  Stable  Continue current medications    # Nocturnal headaches (resolves in AM)  Suspect neck origin  Try different neck support

## 2023-11-02 ENCOUNTER — APPOINTMENT (OUTPATIENT)
Dept: PRIMARY CARE | Facility: CLINIC | Age: 83
End: 2023-11-02
Payer: COMMERCIAL

## 2023-11-15 ENCOUNTER — OFFICE VISIT (OUTPATIENT)
Dept: NEUROLOGY | Facility: HOSPITAL | Age: 83
End: 2023-11-15
Payer: COMMERCIAL

## 2023-11-15 VITALS
WEIGHT: 283 LBS | RESPIRATION RATE: 18 BRPM | HEIGHT: 64 IN | TEMPERATURE: 97.1 F | SYSTOLIC BLOOD PRESSURE: 154 MMHG | HEART RATE: 87 BPM | BODY MASS INDEX: 48.32 KG/M2 | DIASTOLIC BLOOD PRESSURE: 81 MMHG

## 2023-11-15 DIAGNOSIS — R51.9 ACUTE NONINTRACTABLE HEADACHE, UNSPECIFIED HEADACHE TYPE: ICD-10-CM

## 2023-11-15 DIAGNOSIS — D32.9 MENINGIOMA (MULTI): ICD-10-CM

## 2023-11-15 DIAGNOSIS — R51.9 CHRONIC DAILY HEADACHE: Primary | ICD-10-CM

## 2023-11-15 PROCEDURE — 99215 OFFICE O/P EST HI 40 MIN: CPT | Performed by: STUDENT IN AN ORGANIZED HEALTH CARE EDUCATION/TRAINING PROGRAM

## 2023-11-15 PROCEDURE — 1159F MED LIST DOCD IN RCRD: CPT | Performed by: STUDENT IN AN ORGANIZED HEALTH CARE EDUCATION/TRAINING PROGRAM

## 2023-11-15 PROCEDURE — 1036F TOBACCO NON-USER: CPT | Performed by: STUDENT IN AN ORGANIZED HEALTH CARE EDUCATION/TRAINING PROGRAM

## 2023-11-15 PROCEDURE — 1126F AMNT PAIN NOTED NONE PRSNT: CPT | Performed by: STUDENT IN AN ORGANIZED HEALTH CARE EDUCATION/TRAINING PROGRAM

## 2023-11-15 PROCEDURE — 3077F SYST BP >= 140 MM HG: CPT | Performed by: STUDENT IN AN ORGANIZED HEALTH CARE EDUCATION/TRAINING PROGRAM

## 2023-11-15 PROCEDURE — 1160F RVW MEDS BY RX/DR IN RCRD: CPT | Performed by: STUDENT IN AN ORGANIZED HEALTH CARE EDUCATION/TRAINING PROGRAM

## 2023-11-15 PROCEDURE — 3079F DIAST BP 80-89 MM HG: CPT | Performed by: STUDENT IN AN ORGANIZED HEALTH CARE EDUCATION/TRAINING PROGRAM

## 2023-11-15 ASSESSMENT — PAIN SCALES - GENERAL: PAINLEVEL: 0-NO PAIN

## 2023-11-15 NOTE — PATIENT INSTRUCTIONS
Dear Ms. Hightower      For your headaches please take NSAIDs (e.g excedrin, aleeve, ibuprofen, tylenol). Please avoid taking these more than 2-3 times per week.  There is a condition that causes inflammation of blood vessels that can cause headaches and vision problems. This is called giant cell temporal arteritis. Diagnosing this condition starts with blood work.  Please get this done within the next few days. If the blood test is abnormal we will call you and begin treatment. Please follow up with us in about 3 months.     Sincerely,     Dr. Addie Villatoro MD  Department of Neurology, PGY-2

## 2023-11-15 NOTE — PROGRESS NOTES
Chief Complaint: Headaches    History of Present Illness: Ana Hightower is a 82 y.o. female with a PMHx of breast cancer in remission, HTN, HLD, CKD stage 3, thyroid cancer s/p hypothyroidism, CTS, Meningioma, Osteoarthritis, Pedal edema, fatty liver,   who presents to [facility] neurology clinic for headaches.     Meningioma history: Previously followed with Dr. Narciso Houser and Dr. Stevesn. Diagnosed ~10 years ago after presenting with a headache. Headaches were never bothersome, occurred once every few months and went away without medications.  MRI Brain 02/2023 remarkable for non   specific white matter changes and left petrosal menigioma minimally enlarged compared to 04/2020    Current Headache History:   Headache started 1 month ago. Occurs daily waking her up from her sleep ~3AM, and goes away after waking in the morning. Located in the left fronto/temporal radiates behind the ear and down the shoulder. Describes it as a throbbing at a 7/10 intensity. Denies associated scalp tenderness. Denies visual changes. Improves with Excedrin, which she takes ~1x/week.  Denies photophobia, phonophobia, aura, alcohol use, smoking. Does not affect her daily activities, not worse with positional changes. No triggers or other associated symptoms. Sleeps from 8PM/11PM to 7AM most days. Drinks 1 cup coffee/day    ROS: All systems reviewed and were negative except as above    Home Medications:    Current Outpatient Medications   Medication Instructions    amLODIPine (NORVASC) 10 mg, oral, Daily    aspirin 81 mg EC tablet 1 tablet, oral, Daily    cholecalciferol (Vitamin D-3) 50 MCG (2000 UT) tablet 1 tablet, oral, Daily    diclofenac sodium (Voltaren) 1 % gel gel APPLY TOPICALLY TO THE AFFECTED AREA AS NEEDED FOR PAIN    furosemide (LASIX) 20 mg, oral, Daily    ibuprofen 600 mg, oral, Every 6 hours PRN    levothyroxine (SYNTHROID, LEVOXYL) 125 mcg, oral, Daily    lidocaine (Lidoderm) 5 % patch Topical    meclizine (ANTIVERT)  25 mg, oral, 3 times daily PRN    pimecrolimus (Elidel) 1 % cream 1 Application    rosuvastatin (CRESTOR) 10 mg, oral, Daily    triamcinolone (Kenalog) 0.025 % ointment 1 Application    triamterene-hydrochlorothiazid (Maxzide) 75-50 mg tablet 1 tablet, oral, Daily        Past Medical History:    has a past medical history of Encounter for screening for malignant neoplasm of colon (06/24/2016), Epistaxis (08/16/2019), Osteoarthritis of knee, unspecified, Other conditions influencing health status (09/12/2014), Pain in unspecified hand (10/27/2014), Personal history of malignant neoplasm of thyroid (03/31/2014), Personal history of other diseases of the digestive system (05/01/2014), Personal history of other diseases of the nervous system and sense organs, Personal history of other specified conditions (05/10/2016), Trigger finger, unspecified finger (04/06/2015), and Unilateral primary osteoarthritis, right knee.    Past Surgical History:    has a past surgical history that includes Total thyroidectomy (07/30/2013); Knee surgery (07/30/2013); Hand tendon surgery (04/06/2015); Carpal tunnel release (04/06/2015); Total abdominal hysterectomy (03/31/2014); Wynot lymph node biopsy (03/31/2014); and Other surgical history (03/31/2014).    Allergies:   No Known Allergies    Family History:   Family History   Problem Relation Name Age of Onset    Lung cancer Father      Ovarian cancer Sister      Pancreatic cancer Brother      Rectal cancer Son      Breast cancer Other maternal relatives        Past Social History:    reports that she has never smoked. She has never been exposed to tobacco smoke. She has never used smokeless tobacco. She reports that she does not drink alcohol and does not use drugs.    Vitals:   Temp:  [36.2 °C (97.1 °F)] 36.2 °C (97.1 °F)  Heart Rate:  [87] 87  Resp:  [18] 18  BP: (154)/(81) 154/81    Physical Exam:   General Appearance:  No distress, alert, interactive and cooperative.     Mental  State: Orientation was normal to time, place and person. Recent and remote memory was intact.  Language testing was normal for comprehension, repetition, expression, and naming. The patient could correctly interpret a picture.    Ophthalmoscopic: The ophthalmoscopic exam normal. The fundi were well visualized with normal disc margins, clear vessels and vascular pulsations. No disc edema. The cup/disk ratio was not enlarged. No hemorrhages or exudates were present.    Cranial Nerves:   CN: Visual fields full to confrontation. Visual acuity 20/20 right eye, 20/20 left eye.   CN 3, 4, 6: Pupils round, 4 mm in diameter, equally reactive to light. Lids symmetric; no ptosis. EOMs normal alignment, full range with normal saccades, pursuit and convergence.   No nystagmus.   CN 5: Facial sensation intact bilaterally.   CN 7: Normal and symmetric facial strength. Nasolabial folds symmetric.   CN 8: Hearing intact to voice  CN 9: Palate elevates symmetrically.   CN 11: Normal strength of shoulder shrug and neck turning.   CN 12: Tongue midline, with normal bulk and strength; no fasciculations.     Motor: Muscle bulk and tone were normal in both upper and lower extremities. No fasciculations, tremor or other abnormal movements were present.     Strength   R L  Deltoid  5 5  Biceps  5 5  Triceps  5 5    5 5    Hip flexion 5- 5  Quadriceps 5 5  Hamstrings 5 5  DorsiFlex 5          5  PlantarFlex 5 5    Reflexes: Right/ Left:  Biceps 2/2, brachioradialis 2/2, triceps 1/1, patellar absent/absent, ankle 1/1  Babinski: toes downgoing to plantar stimulation. No clonus, frontal release signs or other pathologic reflexes present.     Sensory: In both upper and lower extremities, sensation was intact to light touch    Coordination: In both upper extremities, finger-nose-finger was intact without dysmetria or overshoot. In both lower extremities, heel-to-shin was intact.     Gait: Station was stable but cautious. Assisted with walker  d/t right knee pain. No ataxia, shuffling, steppage or waddling was present. No circumduction was present.    Assessment/Recommendations  Ana Hightower is a 82 y.o. female with a PMHx of breast cancer in remission, HTN, HLD, CKD stage 3, thyroid cancer s/p hypothyroidism, CTS, Meningioma, Osteoarthritis, Pedal edema, fatty liver,   who presents to [facility] neurology clinic for headaches. Headaches occur daily, are located in the left frontotemporal region and are not associated with typical migraine features Although there is no scalp tenderness or vision changes, he headaches started acutely and that she is over the age of 48 and the headaches wake her from her sleep there is c/f giant cell temporal arteritis. MRI Brain 02/2023 remarkable for non specific white matter changes and left petrosal menigioma minimally enlarged compared to 04/2020. Given the relatively stable MRI it was be unusual for her meningioma of over 10 years to be causing such acute onset symptoms. Will obtain ESR/CRP for GCA labs and if negative will consider repeat imaging for alternative structural causes.     1. Acute nonintractable headache, unspecified headache type  - Sedimentation rate, automated; Future  - C-Reactive Protein; Future  - Follow Up In Neurology in 3 months   - Pt number included for reference: [phone]    Addie Villatoro MD  Department of Neurology, PGY-2     ATTENDING PHYSICIAN ATTESTATION:  I saw and evaluated the patient. I personally obtained the key and critical portions of the history and physical exam. I reviewed the resident's documentation and discussed the patient with the resident. I agree with the resident's medical decision making as documented in the note except/in addition:    She has a known left petrosal meningioma and mild headaches but her headaches have increased in frequency and intensity recently. She hasn't had any weight loss or any other symptoms suggestive of GCA but because of her age, we will  check ESR and CRP. If those are normal, we will treat her symptomatically and repeat MRI brain w/wo Ren to assess the meningioma.    Art Ramos MD    The total appointment time today was 45 minutes. Time included preparing to see the patient, obtaining the history, performing a medically necessary appropriate physical examination, counseling and educating the patient/family/caregiver, ordering medications, tests and procedures, referring and communicating with other providers, independently interpreting results and communicating the results to the patient/family/caregiver, care coordination] and documenting clinical information in the medical record.

## 2023-11-16 ENCOUNTER — LAB (OUTPATIENT)
Dept: LAB | Facility: LAB | Age: 83
End: 2023-11-16
Payer: COMMERCIAL

## 2023-11-16 DIAGNOSIS — E89.0 POSTOPERATIVE HYPOTHYROIDISM: ICD-10-CM

## 2023-11-16 DIAGNOSIS — R51.9 ACUTE NONINTRACTABLE HEADACHE, UNSPECIFIED HEADACHE TYPE: ICD-10-CM

## 2023-11-16 LAB
CRP SERPL-MCNC: 0.68 MG/DL
ERYTHROCYTE [SEDIMENTATION RATE] IN BLOOD BY WESTERGREN METHOD: 23 MM/H (ref 0–30)
TSH SERPL-ACNC: 2.08 MIU/L (ref 0.44–3.98)

## 2023-11-16 PROCEDURE — 84443 ASSAY THYROID STIM HORMONE: CPT

## 2023-11-16 PROCEDURE — 36415 COLL VENOUS BLD VENIPUNCTURE: CPT

## 2023-11-16 PROCEDURE — 85652 RBC SED RATE AUTOMATED: CPT

## 2023-11-16 PROCEDURE — 86140 C-REACTIVE PROTEIN: CPT

## 2024-01-31 ENCOUNTER — OFFICE VISIT (OUTPATIENT)
Dept: NEUROLOGY | Facility: HOSPITAL | Age: 84
End: 2024-01-31
Payer: COMMERCIAL

## 2024-01-31 VITALS
HEART RATE: 60 BPM | DIASTOLIC BLOOD PRESSURE: 63 MMHG | BODY MASS INDEX: 47.29 KG/M2 | RESPIRATION RATE: 18 BRPM | SYSTOLIC BLOOD PRESSURE: 147 MMHG | TEMPERATURE: 98.2 F | WEIGHT: 277 LBS | HEIGHT: 64 IN

## 2024-01-31 DIAGNOSIS — R51.9 ACUTE NONINTRACTABLE HEADACHE, UNSPECIFIED HEADACHE TYPE: Primary | ICD-10-CM

## 2024-01-31 PROCEDURE — 99214 OFFICE O/P EST MOD 30 MIN: CPT | Mod: GC | Performed by: STUDENT IN AN ORGANIZED HEALTH CARE EDUCATION/TRAINING PROGRAM

## 2024-01-31 PROCEDURE — 99204 OFFICE O/P NEW MOD 45 MIN: CPT | Performed by: STUDENT IN AN ORGANIZED HEALTH CARE EDUCATION/TRAINING PROGRAM

## 2024-01-31 PROCEDURE — 3077F SYST BP >= 140 MM HG: CPT | Performed by: STUDENT IN AN ORGANIZED HEALTH CARE EDUCATION/TRAINING PROGRAM

## 2024-01-31 PROCEDURE — 1036F TOBACCO NON-USER: CPT | Performed by: STUDENT IN AN ORGANIZED HEALTH CARE EDUCATION/TRAINING PROGRAM

## 2024-01-31 PROCEDURE — 3078F DIAST BP <80 MM HG: CPT | Performed by: STUDENT IN AN ORGANIZED HEALTH CARE EDUCATION/TRAINING PROGRAM

## 2024-01-31 PROCEDURE — 1126F AMNT PAIN NOTED NONE PRSNT: CPT | Performed by: STUDENT IN AN ORGANIZED HEALTH CARE EDUCATION/TRAINING PROGRAM

## 2024-01-31 ASSESSMENT — PAIN SCALES - GENERAL: PAINLEVEL: 0-NO PAIN

## 2024-01-31 NOTE — PROGRESS NOTES
Chief Complaint: Headaches    History of Present Illness: Ana Hightower is a 83 y.o. female with a PMHx of breast cancer in remission, HTN, HLD, CKD stage 3, thyroid cancer s/p hypothyroidism, CTS, Meningioma, Osteoarthritis, Pedal edema, fatty liver, who presents to neurology resident clinic for headache follow up.     Meningioma history: Previously followed with Dr. Narciso Houser and Dr. Stevens. Diagnosed ~10 years ago after presenting with a headache. Headaches were never bothersome, occurred once every few months and went away without medications.  MRI Brain 02/2023 remarkable for non   specific white matter changes and left petrosal menigioma minimally enlarged compared to 04/2020    Headache History:   Headache started in 2022.  Occurs daily waking her up from her sleep ~3AM, and goes away after waking in the morning. Located in the left fronto/temporal radiates behind the ear and down the shoulder. Describes it as a throbbing at a 7/10 intensity. Denies associated scalp tenderness. Denies visual changes. Improves with Excedrin, which she takes ~1x/week.  Denies photophobia, phonophobia, aura, alcohol use, smoking. Does not affect her daily activities, not worse with positional changes. No triggers or other associated symptoms. Sleeps from 8PM-11PM to 7AM most days. Wakes up frequently, and is only gettingn about 4 hours of sleep. Drinks 1 cup coffee/day    Interval History  Established care in resident clinic in 11/2023 for worsening headaches. ESR/CRP obtained and were WNL. Today she reports that he headaches went away for about a month around the later half of November. Headaches started again in middle of January,     Today she clarifies that the headaches did not start getting worse in Oct2023, but it got worse around 2022. HA history was reviewed and updated as above. For the month of Jan2024 she states that she has daily headaches. Reports that she has been getting about 4 hours of sleep a night.  States that she drinks 2-4 cups of water per day. States that sometime the pain radiates down to the base of her neck.    ROS: All systems reviewed and were negative except as above    Home Medications:    Current Outpatient Medications   Medication Instructions    amLODIPine (NORVASC) 10 mg, oral, Daily    aspirin 81 mg EC tablet 1 tablet, oral, Daily    cholecalciferol (Vitamin D-3) 50 MCG (2000 UT) tablet 1 tablet, oral, Daily    diclofenac sodium (Voltaren) 1 % gel gel APPLY TOPICALLY TO THE AFFECTED AREA AS NEEDED FOR PAIN    furosemide (LASIX) 20 mg, oral, Daily    ibuprofen 600 mg, oral, Every 6 hours PRN    levothyroxine (SYNTHROID, LEVOXYL) 125 mcg, oral, Daily    lidocaine (Lidoderm) 5 % patch Topical    meclizine (ANTIVERT) 25 mg, oral, 3 times daily PRN    pimecrolimus (Elidel) 1 % cream 1 Application    rosuvastatin (CRESTOR) 10 mg, oral, Daily    triamcinolone (Kenalog) 0.025 % ointment 1 Application    triamterene-hydrochlorothiazid (Maxzide) 75-50 mg tablet 1 tablet, oral, Daily        Past Medical History:    has a past medical history of Encounter for screening for malignant neoplasm of colon (06/24/2016), Epistaxis (08/16/2019), Osteoarthritis of knee, unspecified, Other conditions influencing health status (09/12/2014), Pain in unspecified hand (10/27/2014), Personal history of malignant neoplasm of thyroid (03/31/2014), Personal history of other diseases of the digestive system (05/01/2014), Personal history of other diseases of the nervous system and sense organs, Personal history of other specified conditions (05/10/2016), Trigger finger, unspecified finger (04/06/2015), and Unilateral primary osteoarthritis, right knee.    Past Surgical History:    has a past surgical history that includes Total thyroidectomy (07/30/2013); Knee surgery (07/30/2013); Hand tendon surgery (04/06/2015); Carpal tunnel release (04/06/2015); Total abdominal hysterectomy (03/31/2014); [place] lymph node biopsy  (03/31/2014); and Other surgical history (03/31/2014).    Allergies:   No Known Allergies    Family History:   Family History   Problem Relation Name Age of Onset    Lung cancer Father      Ovarian cancer Sister      Pancreatic cancer Brother      Rectal cancer Son      Breast cancer Other maternal relatives        Past Social History:    reports that she has never smoked. She has never been exposed to tobacco smoke. She has never used smokeless tobacco. She reports that she does not drink alcohol and does not use drugs.    Vitals:   Temp:  [36.8 °C (98.2 °F)] 36.8 °C (98.2 °F)  Heart Rate:  [60] 60  Resp:  [18] 18  BP: (147)/(63) 147/63    Physical Exam:   General Appearance:  No distress, alert, interactive and cooperative.     Mental State: Orientation was normal to time, place and person. Recent and remote memory was intact.  Language testing was normal for comprehension, repetition, expression, and naming.     Ophthalmoscopic: The ophthalmoscopic exam normal. The fundi were well visualized with normal disc margins, clear vessels and vascular pulsations. No disc edema. No hemorrhages or exudates were present.    Cranial Nerves:   CN: Visual fields full to confrontation.   CN 3, 4, 6: Pupils round, 2 mm in diameter, equally reactive to light. Lids symmetric; no ptosis. EOMs normal alignment, full range with normal saccades, pursuit and convergence.   No nystagmus.   CN 5: Facial sensation intact bilaterally.   CN 7: Normal and symmetric facial strength. Nasolabial folds symmetric.   CN 8: Hearing intact to voice  CN 9: Palate elevates symmetrically.   CN 11: Normal strength of shoulder shrug and neck turning.   CN 12: Tongue midline, with normal bulk and strength; no fasciculations.     Motor: Muscle bulk and tone were normal in both upper and lower extremities. No fasciculations, tremor or other abnormal movements were present.     Strength   R L  Deltoid  5- 5-  Biceps  5 5  Triceps  5 5    5 5    Hip  flexion 5 5  Quadriceps 5 5  Hamstrings 5 5  DorsiFlex 5          5  PlantarFlex 5 5    Reflexes: Right/ Left:  Biceps 2/2, brachioradialis 2/2, triceps 1/1, patellar absent/absent, ankle 1/1  Babinski: toes downgoing to plantar stimulation. No clonus, frontal release signs or other pathologic reflexes present.     Sensory: In both upper and lower extremities, sensation was intact to light touch    Coordination: In both upper extremities, finger-nose-finger was intact without dysmetria or overshoot. In both lower extremities, heel-to-shin was intact.     Gait: Station was stable but cautious. Assisted with walker. No ataxia, shuffling, steppage or waddling was present.     Assessment/Recommendations  nAa Hightower is a 83 y.o. female with a PMHx of breast cancer in remission, HTN, HLD, CKD stage 3, thyroid cancer s/p hypothyroidism, CTS, Meningioma, Osteoarthritis, Pedal edema, fatty liver, who presents to [facility] neurology clinic for headache follow up. HA remains unchanged in quality (nightly at 3AM, L frontotemporal).  Only uses nsaids for OA pain. No scalp tenderness on exam or vision changes. ESR/CRP WNL. Low c/f GCA.  Funduscopic exam normal. Suspect etiology of headaches are 2/2 to hypnic headaches, however will r/o structural etiology of HA (e.g worsening meningioma).     1. Acute nonintractable headache, unspecified headache type  - MRI w/o contrast ordered per nsgy 1 year follow up - scheduled in March 2023 - w/o contrast ordered likely d/t Cr bump  - Return to clinic in 3 months    Addie Villatoro MD  Department of Neurology, PGY-2

## 2024-01-31 NOTE — PATIENT INSTRUCTIONS
Dear Ms. Hightower     We tested you for a condition called giant cell temporal arterities, this causes inflammation of blood vessels that can cause headaches and vision problems. The tests for this were normal.     We would like to obtain an MRI to ensure your meningioma is stable.     We suspect the type of headache you have is called a hypnic headache. These headaches occur only at night, last only for a few hours, and almost always occur during sleep. It is most common in individuals aged 60s-80s. These headaches can be treated by a cup of coffee before bed.     You may continue to take NSAIDs (e.g excedrin, aleeve, ibuprofen, tylenol). Do not mix these medications and please avoid taking these more than 2 times per week, or 10 times per month.      Please keep a headache diary documenting your headaches and activities of the day that may be associated with our headache.    Sincerely,   Dr. Addie Villatoro MD  Department of Neurology, PGY-2

## 2024-02-14 ENCOUNTER — APPOINTMENT (OUTPATIENT)
Dept: ENDOCRINOLOGY | Facility: CLINIC | Age: 84
End: 2024-02-14
Payer: COMMERCIAL

## 2024-02-15 ENCOUNTER — OFFICE VISIT (OUTPATIENT)
Dept: PRIMARY CARE | Facility: CLINIC | Age: 84
End: 2024-02-15
Payer: COMMERCIAL

## 2024-02-15 VITALS — BODY MASS INDEX: 46.44 KG/M2 | TEMPERATURE: 97.5 F | HEIGHT: 64 IN | WEIGHT: 272 LBS

## 2024-02-15 DIAGNOSIS — C50.212 MALIGNANT NEOPLASM OF UPPER-INNER QUADRANT OF LEFT BREAST IN FEMALE, ESTROGEN RECEPTOR POSITIVE (MULTI): ICD-10-CM

## 2024-02-15 DIAGNOSIS — D32.9 MENINGIOMA (MULTI): ICD-10-CM

## 2024-02-15 DIAGNOSIS — R42 VERTIGO: ICD-10-CM

## 2024-02-15 DIAGNOSIS — Z00.00 ROUTINE GENERAL MEDICAL EXAMINATION AT HEALTH CARE FACILITY: Primary | ICD-10-CM

## 2024-02-15 DIAGNOSIS — N18.32 STAGE 3B CHRONIC KIDNEY DISEASE (MULTI): ICD-10-CM

## 2024-02-15 DIAGNOSIS — Z17.0 MALIGNANT NEOPLASM OF UPPER-INNER QUADRANT OF LEFT BREAST IN FEMALE, ESTROGEN RECEPTOR POSITIVE (MULTI): ICD-10-CM

## 2024-02-15 DIAGNOSIS — E78.49 OTHER HYPERLIPIDEMIA: ICD-10-CM

## 2024-02-15 DIAGNOSIS — Z71.89 ADVANCED CARE PLANNING/COUNSELING DISCUSSION: ICD-10-CM

## 2024-02-15 DIAGNOSIS — E89.0 POSTOPERATIVE HYPOTHYROIDISM: ICD-10-CM

## 2024-02-15 DIAGNOSIS — I10 PRIMARY HYPERTENSION: ICD-10-CM

## 2024-02-15 LAB
POC HDL CHOLESTEROL: 87 MG/DL (ref 0–50)
POC LDL CHOLESTEROL: 92 MG/DL (ref 0–100)
POC NON-HDL CHOLESTEROL: 2.5 MG/DL (ref 0–130)
POC TOTAL CHOLESTEROL: 218 MG/DL (ref 0–199)
POC TRIGLYCERIDES: 194 MG/DL (ref 0–150)

## 2024-02-15 PROCEDURE — 1124F ACP DISCUSS-NO DSCNMKR DOCD: CPT | Performed by: INTERNAL MEDICINE

## 2024-02-15 PROCEDURE — 80061 LIPID PANEL: CPT | Performed by: INTERNAL MEDICINE

## 2024-02-15 PROCEDURE — 1036F TOBACCO NON-USER: CPT | Performed by: INTERNAL MEDICINE

## 2024-02-15 PROCEDURE — G0439 PPPS, SUBSEQ VISIT: HCPCS | Performed by: INTERNAL MEDICINE

## 2024-02-15 PROCEDURE — 99497 ADVNCD CARE PLAN 30 MIN: CPT | Performed by: INTERNAL MEDICINE

## 2024-02-15 PROCEDURE — 99214 OFFICE O/P EST MOD 30 MIN: CPT | Performed by: INTERNAL MEDICINE

## 2024-02-15 PROCEDURE — 1126F AMNT PAIN NOTED NONE PRSNT: CPT | Performed by: INTERNAL MEDICINE

## 2024-02-15 RX ORDER — MECLIZINE HYDROCHLORIDE 25 MG/1
25 TABLET ORAL 3 TIMES DAILY PRN
Qty: 90 TABLET | Refills: 0 | Status: SHIPPED | OUTPATIENT
Start: 2024-02-15 | End: 2024-04-24 | Stop reason: WASHOUT

## 2024-02-15 ASSESSMENT — ENCOUNTER SYMPTOMS
LOSS OF SENSATION IN FEET: 0
OCCASIONAL FEELINGS OF UNSTEADINESS: 0
DEPRESSION: 0

## 2024-02-15 ASSESSMENT — PATIENT HEALTH QUESTIONNAIRE - PHQ9
SUM OF ALL RESPONSES TO PHQ9 QUESTIONS 1 AND 2: 0
1. LITTLE INTEREST OR PLEASURE IN DOING THINGS: NOT AT ALL
2. FEELING DOWN, DEPRESSED OR HOPELESS: NOT AT ALL

## 2024-02-15 NOTE — PROGRESS NOTES
"Subjective   Reason for Visit: Ana Hightower is an 83 y.o. female here for a Medicare Wellness visit.               HPI    Patient Care Team:  Og Toussaint MD as PCP - General  Og Toussaint MD as PCP - [employer] PCP     Review of Systems    Objective   Vitals:  Temp 36.4 °C (97.5 °F)   Ht 1.626 m (5' 4\")   Wt 123 kg (272 lb)   BMI 46.69 kg/m²       Physical Exam    Assessment/Plan   Problem List Items Addressed This Visit     Breast cancer (CMS/HCC)    Stage 3b chronic kidney disease (CMS/HCC)    Hyperlipidemia    Relevant Orders    POCT Lipid Panel manually resulted (Completed)    Hypertension    Hypothyroidism    Meningioma (CMS/HCC)    Vertigo    Relevant Medications    meclizine (Antivert) 25 mg tablet    Body mass index (BMI) 45.0-49.9, adult (CMS/HCC)   Other Visit Diagnoses     Routine general medical examination at health care facility    -  Primary    Advanced care planning/counseling discussion                 "

## 2024-02-15 NOTE — PROGRESS NOTES
Ana Hightower is a 83 y.o. female here for a Medicare Wellness Exam.    Chief Complaint   Patient presents with    Medicare Annual Wellness Visit Subsequent        Patient with a past medical history of Breast CA, HTN, HLD, CKD III, Papillary Thyroid CA s/p Hypothyroidism, CTS, Meningioma (MRI due in 2024), Osteoarthritis, Gout, Pedal edema, FAtty Liver, Mammogram (June)    Headaches are better, now minimal  MRI pending    Feels fine  No chest pain/  SOB/ dizziness  BM OK  Energy level ok  Appetite OK             Medicare Wellness Exam    The patent is being seen for a follow up annual wellness visit  Past Medical, Surgical and family History: Reviewed and updated in chart  Interval History: Patient has not been hospitalized previously  Medications and Supplements: Review of all medications by a prescribing practitioner or clinical pharmacist (such as prescriptions, OTC, Herbal therapies and supplements) documented in the medical record.    Patient Self-Assessment of health: Good  Tobacco Use: No  Alcohol Use: No  Illicit drug use: No  Patient using opioids: No    Current Diet: well balanced  Adequate fluid intake: Yes  Caffeine intake: Yes  Exercise frequency: moderately active    Depression/Suicide screening: PHQ2/ PHQ9 (see screenings tab)    Hearing impairment: No  Uses hearing aids N/A  Cognitive impairment Observation: No   Patient or family reported cognitive impairment: No    Bathing: independent  Dressing: independent  Walking: independent  Taking Medications: independent  Feeding: independent  Personal Hygiene: independent  Managing Finances: independent  Shopping: independent  Housework/Basic Home Maintenance: independent  Handling transportation: with partial assistance  Preparing meals: independent    Bowels: continent  Bladder: continent    Falls Risk: has notfallen in last 6 months.   Their fall has not resulted in an injury.   Fall risk Factors: Fall Risk Factors:    none   Care Plan Risk: Care  Plan: Low/Moderate Risk: Regular physical activity such as walking, water aerobics or anton chi to improve strength, balance, coordination and flexibility. Wear appropriate, sensible shoe wear. Remove fall hazards at home such as loose rugs, obstacles, use non-slip surface in bath or shower. Keep living space well lit.      Home Safety Risk Factors: Home Safety Risk Factors: None  Advanced Directives:  Living will: No POA: No    Patient's End of Life Decisions: Provider agree to follow.      Past Medical History:   Diagnosis Date    Encounter for screening for malignant neoplasm of colon 06/24/2016    Encounter for screening colonoscopy    Epistaxis 08/16/2019    Epistaxis, recurrent    Osteoarthritis of knee, unspecified     Osteoarthritis of knee    Other conditions influencing health status 09/12/2014    Normal colonoscopy    Pain in unspecified hand 10/27/2014    Hand pain    Personal history of malignant neoplasm of thyroid 03/31/2014    History of malignant neoplasm of thyroid    Personal history of other diseases of the digestive system 05/01/2014    History of angular cheilitis    Personal history of other diseases of the nervous system and sense organs     History of carpal tunnel syndrome    Personal history of other specified conditions 05/10/2016    History of insomnia    Trigger finger, unspecified finger 04/06/2015    Triggering of finger    Unilateral primary osteoarthritis, right knee     Primary osteoarthritis of right knee        Review of Systems     Constitutional: no fever, no chills, not feeling poorly, not feeling tired and no recent weight gain, no recent weight loss.   ENT: no earache, no hearing loss, no nosebleeds, no nasal discharge, no sore throat and no hoarseness.   Cardiovascular: the heart rate was not slow, the heart rate was not fast, no chest pain, no palpitations, no intermittent leg claudication and no lower extremity edema.   Respiratory: no cough, wheezing or shortness of breath  at rest or exertion  Gastrointestinal: no abdominal pain, no constipation, no melena, no nausea, no diarrhea, no vomiting and no blood in stools.   Musculoskeletal: no arthralgias, no myalgias, no back pain, no joint swelling, no joint stiffness, no limb pain and no limb swelling.   Integumentary: no skin rashes, no skin lesions, no itching, no skin wound and no dry skin.   Neurological: no headache, no confusion, no numbness, no dizziness, no tingling and no fainting.   All other systems have been reviewed and are negative for complaint.          Physical Exam     Constitutional   General appearance: Alert and in no acute distress.     Pulmonary   Respiratory assessment: No respiratory distress, normal respiratory rhythm and effort.    Auscultation of Lungs: Clear bilateral breath sounds.   Cardiovascular   Auscultation of heart: Apical pulse normal, heart rate and rhythm normal, normal S1 and S2, no murmurs and no pericardial rub.    Exam for edema: No peripheral edema.   Abdomen   Abdominal Exam: No bruits, normal bowel sounds, soft, non-tender, no abdominal mass palpated.    Liver and Spleen exam: No hepato-splenomegaly.   Musculoskeletal   Examination of gait: Normal.    Inspection of digits and nails: No clubbing or cyanosis of the fingernails.    Inspection/palpation of joints, bones and muscles: No joint swelling. Normal movement of all extremities.   Skin   Skin inspection: Normal skin color and pigmentation, normal skin turgor and no visible rash.   Neurologic   Cranial nerves: Nerves 2-12 were intact, no focal neuro defects.          Assessment/Plan          Patient with a past medical history of Breast CA, HTN, HLD, CKD III, Papillary Thyroid CA s/p Hypothyroidism, CTS, Meningioma (MRI due in 2024), Osteoarthritis, Gout, Pedal edema, FAtty Liver, Mammogram (June)     # HLD  Stable  Continue current medications  Watch salt, avoid excessive caffeine  Avoid processed meats/ sugars/juices Instead eat fresh  fruit  Add walnuts and almonds to your diet  exercise 6 days a week for 30 minutes        # HTN/ CKD III  Stable  Continue current medications     # OA of knees  Rx for walker     # Hypothyroidism  Stable  Continue current medications     # Nocturnal headaches (resolves in AM)  Worked up by Neurology  Negative work up  MRI pending (meningioma)  They feel that its Hypnic Headaches  (Recommended nocturnal caffeine)    # Chronic Vertigo  Refill Meclizine    Discussed getting Health Care Power of  and Living Will Documents and their importance. These are legal documents obtained through a . Total Time spent in this discussion was less than 30 minutes

## 2024-02-20 ENCOUNTER — HOSPITAL ENCOUNTER (OUTPATIENT)
Dept: RADIOLOGY | Facility: HOSPITAL | Age: 84
Discharge: HOME | End: 2024-02-20
Payer: COMMERCIAL

## 2024-02-20 DIAGNOSIS — D32.9 BENIGN NEOPLASM OF MENINGES, UNSPECIFIED (MULTI): ICD-10-CM

## 2024-02-20 PROCEDURE — 70551 MRI BRAIN STEM W/O DYE: CPT

## 2024-02-20 PROCEDURE — 70551 MRI BRAIN STEM W/O DYE: CPT | Performed by: RADIOLOGY

## 2024-03-07 ENCOUNTER — TELEPHONE (OUTPATIENT)
Dept: PRIMARY CARE | Facility: CLINIC | Age: 84
End: 2024-03-07

## 2024-03-19 NOTE — PROGRESS NOTES
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 Unchanged when compared to previous MRI but imaging limited due to being noncontrasted.       Follow Up:  Continued observation with surveillance imaging. Would repeat in 2 years. Will check with patient at time imaging is due given age if she would like to proceed.

## 2024-04-24 ENCOUNTER — OFFICE VISIT (OUTPATIENT)
Dept: NEUROLOGY | Facility: HOSPITAL | Age: 84
End: 2024-04-24
Payer: COMMERCIAL

## 2024-04-24 VITALS
SYSTOLIC BLOOD PRESSURE: 139 MMHG | BODY MASS INDEX: 47.46 KG/M2 | HEIGHT: 64 IN | DIASTOLIC BLOOD PRESSURE: 72 MMHG | RESPIRATION RATE: 18 BRPM | WEIGHT: 278 LBS | TEMPERATURE: 96.7 F | HEART RATE: 80 BPM

## 2024-04-24 DIAGNOSIS — R42 VERTIGO: ICD-10-CM

## 2024-04-24 DIAGNOSIS — G44.81 HYPNIC HEADACHE: ICD-10-CM

## 2024-04-24 PROCEDURE — 1126F AMNT PAIN NOTED NONE PRSNT: CPT | Performed by: STUDENT IN AN ORGANIZED HEALTH CARE EDUCATION/TRAINING PROGRAM

## 2024-04-24 PROCEDURE — 3078F DIAST BP <80 MM HG: CPT | Performed by: STUDENT IN AN ORGANIZED HEALTH CARE EDUCATION/TRAINING PROGRAM

## 2024-04-24 PROCEDURE — 99213 OFFICE O/P EST LOW 20 MIN: CPT | Performed by: STUDENT IN AN ORGANIZED HEALTH CARE EDUCATION/TRAINING PROGRAM

## 2024-04-24 PROCEDURE — 99213 OFFICE O/P EST LOW 20 MIN: CPT | Mod: GC | Performed by: STUDENT IN AN ORGANIZED HEALTH CARE EDUCATION/TRAINING PROGRAM

## 2024-04-24 PROCEDURE — 3075F SYST BP GE 130 - 139MM HG: CPT | Performed by: STUDENT IN AN ORGANIZED HEALTH CARE EDUCATION/TRAINING PROGRAM

## 2024-04-24 RX ORDER — MECLIZINE HYDROCHLORIDE 25 MG/1
25 TABLET ORAL 3 TIMES DAILY PRN
Start: 2024-04-24

## 2024-04-24 ASSESSMENT — PAIN SCALES - GENERAL: PAINLEVEL: 0-NO PAIN

## 2024-04-24 NOTE — PROGRESS NOTES
Chief Complaint: Headaches    History of Present Illness: Ana Hightower is a 83 y.o. female with a PMHx of breast cancer in remission, HTN, HLD, CKD stage 3, thyroid cancer s/p hypothyroidism, CTS, Meningioma, Osteoarthritis, Pedal edema, fatty liver, who presents to neurology resident clinic for headache follow up.     Meningioma history: Previously followed with Dr. Narciso Houser and Dr. Stevens. Diagnosed ~10 years ago after presenting with a headache. Headaches were never bothersome, occurred once every few months and went away without medications.  MRI Brain 02/2023 remarkable for non   specific white matter changes and left petrosal menigioma minimally enlarged compared to 04/2020    Headache History:  Established care in resident clinic in 11/2023 for worsening headaches. ESR/CRP obtained and were WNL     Headache started in 2022.  Occurs daily waking her up from her sleep ~3AM, and goes away after waking in the morning. Located in the left fronto/temporal radiates behind the ear and down the shoulder. Describes it as a throbbing at a 7/10 intensity. Denies associated scalp tenderness. Denies visual changes. Improves with Excedrin, which she takes ~1x/week.  Denies photophobia, phonophobia, aura, alcohol use, smoking. Does not affect her daily activities, not worse with positional changes. No triggers or other associated symptoms.     Sleeps from 8PM-11PM to 7AM most days. Wakes up frequently, and is only gettingn about 4 hours of sleep. Drinks 1 cup coffee/day.     Only uses nsaids for OA pain. No scalp tenderness on exam or vision changes. ESR/CRP WNL.     Funduscopic exam normal. Suspect etiology of headaches are 2/2 to hypnic headaches, however will r/o structural etiology of HA (e.g worsening meningioma).      Interval History   Headaches improved with a cup of coffee  before bed. No longer driving it. Ocassionally she gets some tingling around the left eye and lower face. Not worse with temperature  fluctuations or touch. Does not bother her. Has been present for the past 2 years. No more headaches, gets some mild pressure above her forehead - not painful.     ROS: All systems reviewed and were negative except as above    Home Medications:    Current Outpatient Medications   Medication Instructions    amLODIPine (NORVASC) 10 mg, oral, Daily    cholecalciferol (Vitamin D-3) 50 MCG (2000 UT) tablet 1 tablet, oral, Daily    diclofenac sodium (Voltaren) 1 % gel gel APPLY TOPICALLY TO THE AFFECTED AREA AS NEEDED FOR PAIN    furosemide (LASIX) 20 mg, oral, Daily    levothyroxine (SYNTHROID, LEVOXYL) 125 mcg, oral, Daily    meclizine (ANTIVERT) 25 mg, oral, 3 times daily PRN    rosuvastatin (CRESTOR) 10 mg, oral, Daily    triamterene-hydrochlorothiazid (Maxzide) 75-50 mg tablet 1 tablet, oral, Daily       Past Medical History:    has a past medical history of Encounter for screening for malignant neoplasm of colon (06/24/2016), Epistaxis (08/16/2019), Osteoarthritis of knee, unspecified, Other conditions influencing health status (09/12/2014), Pain in unspecified hand (10/27/2014), Personal history of malignant neoplasm of thyroid (03/31/2014), Personal history of other diseases of the digestive system (05/01/2014), Personal history of other diseases of the nervous system and sense organs, Personal history of other specified conditions (05/10/2016), Trigger finger, unspecified finger (04/06/2015), and Unilateral primary osteoarthritis, right knee.    Past Surgical History:    has a past surgical history that includes Total thyroidectomy (07/30/2013); Knee surgery (07/30/2013); Hand tendon surgery (04/06/2015); Carpal tunnel release (04/06/2015); Total abdominal hysterectomy (03/31/2014); [place] lymph node biopsy (03/31/2014); and Other surgical history (03/31/2014).    Allergies:   No Known Allergies    Family History:   Family History   Problem Relation Name Age of Onset    Lung cancer Father      Ovarian cancer  Sister      Pancreatic cancer Brother      Rectal cancer Son      Breast cancer Other maternal relatives        Past Social History:    reports that she has never smoked. She has never been exposed to tobacco smoke. She has never used smokeless tobacco. She reports that she does not drink alcohol and does not use drugs.    Vitals:   Temp:  [35.9 °C (96.7 °F)] 35.9 °C (96.7 °F)  Heart Rate:  [80] 80  Resp:  [18] 18  BP: (139)/(72) 139/72    Physical Exam:   General Appearance:  No distress, alert, interactive and cooperative.     Mental State: Orientation was normal to time, place and person. Recent and remote memory was intact.  Language testing was normal for comprehension, repetition, expression, and naming.     Cranial Nerves:   CN: Visual fields full to confrontation.   CN 3, 4, 6: Pupils round, 2 mm in diameter, equally reactive to light. Lids symmetric; no ptosis. EOMs normal alignment, full range with normal saccades, pursuit and convergence.   No nystagmus.   CN 5: Facial sensation intact bilaterally.   CN 7: Normal and symmetric facial strength. Nasolabial folds symmetric.   CN 8: Hearing intact to voice  CN 9: Palate elevates symmetrically.   CN 11: Normal strength of shoulder shrug and neck turning.   CN 12: Tongue midline, with normal bulk and strength; no fasciculations.     Motor: Muscle bulk and tone were normal in both upper and lower extremities. No fasciculations, tremor or other abnormal movements were present.     Strength   R L  Deltoid  5- 5-  Biceps  5 5  Triceps  5 5    5- * 5    *limited by arthritis pain   Hip flexion 5 5  Quadriceps 5 5  Hamstrings 5 5  DorsiFlex 5          5  PlantarFlex 5 5    Reflexes: Right/ Left:  Biceps 2/2, brachioradialis 2/2, triceps 1/1, patellar absent/absent, ankle 1/1  Babinski: toes downgoing to plantar stimulation. No clonus, frontal release signs or other pathologic reflexes present.     Sensory: In both upper and lower extremities, sensation was intact  to light touch    Coordination: In both upper extremities, finger-nose-finger was intact without dysmetria or overshoot. In both lower extremities, heel-to-shin was intact.     Gait: Station was stable but cautious. Assisted with walker. No ataxia, shuffling, steppage or waddling was present.     Assessment/Recommendations  Ana Hightower is a 83 y.o. female with a PMHx of breast cancer in remission, HTN, HLD, CKD stage 3, thyroid cancer s/p hypothyroidism, CTS, Meningioma, Osteoarthritis, Pedal edema, fatty liver, who presents to [facility] neurology clinic for headache follow up.  Neurological exam intact and unchanged.  Diagnosed with hypnic headaches during the last vision given nightly 3AM L frontotemporal HA's. Recommenced caffeine intake before bed. Headaches have now resolved. Neurological exam intact and unchanged.     Hypnic headaches   - c/w caffeine at bedtime as needed   - Pt advised to return to clinic as needed     Addie Villatoro MD  Department of Neurology, PGY-2

## 2024-05-21 ENCOUNTER — APPOINTMENT (OUTPATIENT)
Dept: PRIMARY CARE | Facility: CLINIC | Age: 84
End: 2024-05-21
Payer: COMMERCIAL

## 2024-06-03 ENCOUNTER — TELEPHONE (OUTPATIENT)
Dept: SURGICAL ONCOLOGY | Facility: CLINIC | Age: 84
End: 2024-06-03
Payer: COMMERCIAL

## 2024-06-03 DIAGNOSIS — C50.919 MALIGNANT NEOPLASM OF FEMALE BREAST, UNSPECIFIED ESTROGEN RECEPTOR STATUS, UNSPECIFIED LATERALITY, UNSPECIFIED SITE OF BREAST (MULTI): ICD-10-CM

## 2024-06-03 DIAGNOSIS — Z12.31 ENCOUNTER FOR SCREENING MAMMOGRAM FOR MALIGNANT NEOPLASM OF BREAST: ICD-10-CM

## 2024-06-03 NOTE — TELEPHONE ENCOUNTER
Reached out to the patient and discussed her concerns related to discomfort in her lumpectomy area, but no longer bothers her.  I ordered her her annual screening bilateral mammogram and am setting her  up with an appointment with [facility] following her imaging in August.  She was discharged from Dr. Houser in October of 2023.

## 2024-06-09 ENCOUNTER — HOSPITAL ENCOUNTER (EMERGENCY)
Facility: HOSPITAL | Age: 84
Discharge: HOME | End: 2024-06-09
Attending: INTERNAL MEDICINE
Payer: COMMERCIAL

## 2024-06-09 ENCOUNTER — APPOINTMENT (OUTPATIENT)
Dept: RADIOLOGY | Facility: HOSPITAL | Age: 84
End: 2024-06-09
Payer: COMMERCIAL

## 2024-06-09 VITALS
HEART RATE: 72 BPM | BODY MASS INDEX: 49.61 KG/M2 | WEIGHT: 280 LBS | SYSTOLIC BLOOD PRESSURE: 141 MMHG | OXYGEN SATURATION: 99 % | DIASTOLIC BLOOD PRESSURE: 84 MMHG | HEIGHT: 63 IN | RESPIRATION RATE: 17 BRPM | TEMPERATURE: 97.5 F

## 2024-06-09 DIAGNOSIS — N93.9 VAGINAL BLEEDING: Primary | ICD-10-CM

## 2024-06-09 LAB
ALBUMIN SERPL BCP-MCNC: 4.3 G/DL (ref 3.4–5)
ALP SERPL-CCNC: 130 U/L (ref 33–136)
ALT SERPL W P-5'-P-CCNC: 12 U/L (ref 7–45)
ANION GAP SERPL CALC-SCNC: 13 MMOL/L (ref 10–20)
APPEARANCE UR: CLEAR
AST SERPL W P-5'-P-CCNC: 16 U/L (ref 9–39)
BASOPHILS # BLD AUTO: 0.05 X10*3/UL (ref 0–0.1)
BASOPHILS NFR BLD AUTO: 0.8 %
BILIRUB SERPL-MCNC: 0.4 MG/DL (ref 0–1.2)
BILIRUB UR STRIP.AUTO-MCNC: NEGATIVE MG/DL
BUN SERPL-MCNC: 23 MG/DL (ref 6–23)
CALCIUM SERPL-MCNC: 9.5 MG/DL (ref 8.6–10.3)
CHLORIDE SERPL-SCNC: 101 MMOL/L (ref 98–107)
CO2 SERPL-SCNC: 28 MMOL/L (ref 21–32)
COLOR UR: YELLOW
CREAT SERPL-MCNC: 0.92 MG/DL (ref 0.5–1.05)
EGFRCR SERPLBLD CKD-EPI 2021: 61 ML/MIN/1.73M*2
EOSINOPHIL # BLD AUTO: 0.14 X10*3/UL (ref 0–0.4)
EOSINOPHIL NFR BLD AUTO: 2.3 %
ERYTHROCYTE [DISTWIDTH] IN BLOOD BY AUTOMATED COUNT: 14.7 % (ref 11.5–14.5)
GLUCOSE SERPL-MCNC: 111 MG/DL (ref 74–99)
GLUCOSE UR STRIP.AUTO-MCNC: NORMAL MG/DL
HCT VFR BLD AUTO: 42.2 % (ref 36–46)
HGB BLD-MCNC: 13.1 G/DL (ref 12–16)
IMM GRANULOCYTES # BLD AUTO: 0.03 X10*3/UL (ref 0–0.5)
IMM GRANULOCYTES NFR BLD AUTO: 0.5 % (ref 0–0.9)
KETONES UR STRIP.AUTO-MCNC: NEGATIVE MG/DL
LACTATE SERPL-SCNC: 0.9 MMOL/L (ref 0.4–2)
LACTATE SERPL-SCNC: 2.1 MMOL/L (ref 0.4–2)
LEUKOCYTE ESTERASE UR QL STRIP.AUTO: NEGATIVE
LIPASE SERPL-CCNC: 18 U/L (ref 9–82)
LYMPHOCYTES # BLD AUTO: 2.74 X10*3/UL (ref 0.8–3)
LYMPHOCYTES NFR BLD AUTO: 45.2 %
MCH RBC QN AUTO: 27 PG (ref 26–34)
MCHC RBC AUTO-ENTMCNC: 31 G/DL (ref 32–36)
MCV RBC AUTO: 87 FL (ref 80–100)
MONOCYTES # BLD AUTO: 0.41 X10*3/UL (ref 0.05–0.8)
MONOCYTES NFR BLD AUTO: 6.8 %
MUCOUS THREADS #/AREA URNS AUTO: NORMAL /LPF
NEUTROPHILS # BLD AUTO: 2.69 X10*3/UL (ref 1.6–5.5)
NEUTROPHILS NFR BLD AUTO: 44.4 %
NITRITE UR QL STRIP.AUTO: NEGATIVE
NRBC BLD-RTO: 0 /100 WBCS (ref 0–0)
PH UR STRIP.AUTO: 5.5 [PH]
PLATELET # BLD AUTO: 340 X10*3/UL (ref 150–450)
POTASSIUM SERPL-SCNC: 4 MMOL/L (ref 3.5–5.3)
PROT SERPL-MCNC: 8 G/DL (ref 6.4–8.2)
PROT UR STRIP.AUTO-MCNC: NORMAL MG/DL
RBC # BLD AUTO: 4.86 X10*6/UL (ref 4–5.2)
RBC # UR STRIP.AUTO: NEGATIVE /UL
RBC #/AREA URNS AUTO: NORMAL /HPF
SODIUM SERPL-SCNC: 138 MMOL/L (ref 136–145)
SP GR UR STRIP.AUTO: 1.03
UROBILINOGEN UR STRIP.AUTO-MCNC: NORMAL MG/DL
WBC # BLD AUTO: 6.1 X10*3/UL (ref 4.4–11.3)
WBC #/AREA URNS AUTO: NORMAL /HPF

## 2024-06-09 PROCEDURE — 99284 EMERGENCY DEPT VISIT MOD MDM: CPT | Mod: 25

## 2024-06-09 PROCEDURE — 80053 COMPREHEN METABOLIC PANEL: CPT | Performed by: INTERNAL MEDICINE

## 2024-06-09 PROCEDURE — 83690 ASSAY OF LIPASE: CPT | Performed by: INTERNAL MEDICINE

## 2024-06-09 PROCEDURE — 81001 URINALYSIS AUTO W/SCOPE: CPT | Performed by: INTERNAL MEDICINE

## 2024-06-09 PROCEDURE — 36415 COLL VENOUS BLD VENIPUNCTURE: CPT | Performed by: INTERNAL MEDICINE

## 2024-06-09 PROCEDURE — 74177 CT ABD & PELVIS W/CONTRAST: CPT

## 2024-06-09 PROCEDURE — 74177 CT ABD & PELVIS W/CONTRAST: CPT | Performed by: RADIOLOGY

## 2024-06-09 PROCEDURE — 83605 ASSAY OF LACTIC ACID: CPT | Mod: 91 | Performed by: INTERNAL MEDICINE

## 2024-06-09 PROCEDURE — 2550000001 HC RX 255 CONTRASTS: Performed by: INTERNAL MEDICINE

## 2024-06-09 PROCEDURE — 85025 COMPLETE CBC W/AUTO DIFF WBC: CPT | Performed by: INTERNAL MEDICINE

## 2024-06-09 RX ADMIN — IOHEXOL 75 ML: 350 INJECTION, SOLUTION INTRAVENOUS at 14:55

## 2024-06-09 ASSESSMENT — COLUMBIA-SUICIDE SEVERITY RATING SCALE - C-SSRS
6. HAVE YOU EVER DONE ANYTHING, STARTED TO DO ANYTHING, OR PREPARED TO DO ANYTHING TO END YOUR LIFE?: NO
1. IN THE PAST MONTH, HAVE YOU WISHED YOU WERE DEAD OR WISHED YOU COULD GO TO SLEEP AND NOT WAKE UP?: NO
2. HAVE YOU ACTUALLY HAD ANY THOUGHTS OF KILLING YOURSELF?: NO

## 2024-06-09 ASSESSMENT — PAIN SCALES - GENERAL
PAINLEVEL_OUTOF10: 0 - NO PAIN

## 2024-06-09 ASSESSMENT — PAIN - FUNCTIONAL ASSESSMENT: PAIN_FUNCTIONAL_ASSESSMENT: 0-10

## 2024-06-09 NOTE — ED TRIAGE NOTES
Pt states that noticed blood in her urine Thursday , pt states that it started getting heavier on Friday. Pt states that the blood has slowed down since and only noticed a little bit this morning

## 2024-06-09 NOTE — ED PROVIDER NOTES
HPI     CC: Blood in Urine     HPI: Ana Hightower is a 83 y.o. female with a history of HTN, HLD, CKD, thyroid CA s/p thyroidectomy, breast CA in remission, CTS, meningioma, OA, fatty liver, hysterectomy, presents with vaginal bleeding or hematuria.  Patient states that Thursday she noticed some blood in the toilet after she ate some watermelon.  She wiped but there was nothing there.  The following day she noticed a lot of blood on the tissue when she wiped and then later in her bed that night.  The following day she had no further bleeding but last night there was a little.  This morning she has not noticed any.  She thinks the blood is coming from her vagina, not her rectum or her urine although she did notice some blood-tinged urine one of the times.  She denies any dysuria, fever, chills, abdominal pain, chest pain, shortness of breath, or other symptoms.      ROS: 10-point review of systems was performed and is otherwise negative except as noted in HPI.    Limitations to history: N/A    Independent Historians: N/A    External Records Reviewed: Outpatient notes in EMR    Past Medical History: Noncontributory except per HPI     Past Surgical History: Noncontributory except per HPI     Family History: Reviewed and noncontributory     Social History:  Denies tobacco. Denies ETOH. Denies illicit drugs.    Social Determinants Affecting Care: N/A    No Known Allergies    Home Meds:   Current Outpatient Medications   Medication Instructions    amLODIPine (NORVASC) 10 mg, oral, Daily    cholecalciferol (Vitamin D-3) 50 MCG (2000 UT) tablet 1 tablet, oral, Daily    diclofenac sodium (Voltaren) 1 % gel gel APPLY TOPICALLY TO THE AFFECTED AREA AS NEEDED FOR PAIN    furosemide (LASIX) 20 mg, oral, Daily    levothyroxine (SYNTHROID, LEVOXYL) 125 mcg, oral, Daily    meclizine (ANTIVERT) 25 mg, oral, 3 times daily PRN    rosuvastatin (CRESTOR) 10 mg, oral, Daily    triamterene-hydrochlorothiazid (Maxzide) 75-50 mg tablet 1  "tablet, oral, Daily        Physical Exam     ED Triage Vitals [06/09/24 1217]   Temperature Heart Rate Respirations BP   36.4 °C (97.5 °F) 81 18 150/67      Pulse Ox Temp Source Heart Rate Source Patient Position   97 % Tympanic Monitor Sitting      BP Location FiO2 (%)     Right arm --         Heart Rate:  [71-81]   Temperature:  [36.4 °C (97.5 °F)]   Respirations:  [17-18]   BP: (141-162)/(67-94)   Height:  [160 cm (5' 3\")]   Weight:  [127 kg (280 lb)]   Pulse Ox:  [95 %-99 %]      Physical Exam  Vitals and nursing note reviewed.     CONSTITUTIONAL: Well appearing, well nourished, in no acute distress.   HENMT: Head atraumatic. Airway patent. Nasal mucosa clear. Mouth with normal mucosa, clear oropharynx. Uvula midline. Neck supple.    EYES: Clear bilaterally, pupils equally round and reactive to light.   CARDIOVASCULAR: Normal rate, regular rhythm.  Heart sounds S1, S2.  No murmurs, rubs or gallops. Normal pulses. Capillary refill < 2 sec.   RESPIRATORY: No increased work of breathing. Breath sounds clear and equal bilaterally.  GASTROINTESTINAL: Abdomen soft, non-distended, non-tender. No rebound, no guarding. Normal bowel sounds. No palpable masses.  GENITOURINARY:  No CVA tenderness. No inguinal lymphadenopathy or masses.  Normal-appearing external female genitalia with no rash or lesions.  Speculum exam with normal slightly erythematous cervix, no erythema, lesions, bleeding or discharge.  Bimanual exam with uterus nonpalpable, no adnexal TTP, no palpable masses. Chaperone present throughout the exam.  MUSCULOSKELETAL: Spine appears normal, range of motion is not limited, no muscle or joint tenderness. No edema.   NEUROLOGICAL: Alert and oriented, no asymmetry, moving all extremities equally.  SKIN: Warm, dry and intact. No rash or notable lesions.  PSYCHIATRIC: Normal mood and affect.  HEME/LYMPH: No adenopathy or splenomegaly.    Diagnostic Results      ECG: None performed    Labs Reviewed   CBC WITH AUTO " DIFFERENTIAL - Abnormal       Result Value    WBC 6.1      nRBC 0.0      RBC 4.86      Hemoglobin 13.1      Hematocrit 42.2      MCV 87      MCH 27.0      MCHC 31.0 (*)     RDW 14.7 (*)     Platelets 340      Neutrophils % 44.4      Immature Granulocytes %, Automated 0.5      Lymphocytes % 45.2      Monocytes % 6.8      Eosinophils % 2.3      Basophils % 0.8      Neutrophils Absolute 2.69      Immature Granulocytes Absolute, Automated 0.03      Lymphocytes Absolute 2.74      Monocytes Absolute 0.41      Eosinophils Absolute 0.14      Basophils Absolute 0.05     COMPREHENSIVE METABOLIC PANEL - Abnormal    Glucose 111 (*)     Sodium 138      Potassium 4.0      Chloride 101      Bicarbonate 28      Anion Gap 13      Urea Nitrogen 23      Creatinine 0.92      eGFR 61      Calcium 9.5      Albumin 4.3      Alkaline Phosphatase 130      Total Protein 8.0      AST 16      Bilirubin, Total 0.4      ALT 12     LACTATE - Abnormal    Lactate 2.1 (*)     Narrative:     Venipuncture immediately after or during the administration of Metamizole may lead to falsely low results. Testing should be performed immediately  prior to Metamizole dosing.   URINALYSIS WITH REFLEX MICROSCOPIC - Normal    Color, Urine Yellow      Appearance, Urine Clear      Specific Gravity, Urine 1.029      pH, Urine 5.5      Protein, Urine 10 (TRACE)      Glucose, Urine Normal      Blood, Urine NEGATIVE      Ketones, Urine NEGATIVE      Bilirubin, Urine NEGATIVE      Urobilinogen, Urine Normal      Nitrite, Urine NEGATIVE      Leukocyte Esterase, Urine NEGATIVE     LIPASE - Normal    Lipase 18      Narrative:     Venipuncture immediately after or during the administration of Metamizole may lead to falsely low results. Testing should be performed immediately prior to Metamizole dosing.   LACTATE - Normal    Lactate 0.9      Narrative:     Venipuncture immediately after or during the administration of Metamizole may lead to falsely low results. Testing should  be performed immediately  prior to Metamizole dosing.   MICROSCOPIC ONLY, URINE    WBC, Urine 1-5      RBC, Urine 1-2      Mucus, Urine FEW           CT abdomen pelvis w IV contrast                       Daniella Coma Scale Score: 15                  Procedure  Procedures    ED Course & MDM   Assessment/Plan:   Ana Hightower is a 83 y.o. female with a history of HTN, HLD, CKD, thyroid CA s/p thyroidectomy, breast CA in remission, CTS, meningioma, OA, fatty liver, hysterectomy, presents with vaginal bleeding or hematuria.  She has no obvious bleeding on internal  exam, she appears to still have her cervix.  This could be bleeding in the setting of UTI or occult bladder malignancy.  Workup was initiated with labs, CTAP.    See below for details of ED course and ultimate disposition.    Medications   iohexol (OMNIPaque) 350 mg iodine/mL solution 75 mL (75 mL intravenous Given 6/9/24 1455)        ED Course as of 06/09/24 1618   Sun Jun 09, 2024   1616 Labs are reassuring, notable for CBC without cytosis, significant anemia, normal platelets, normal CMP, lactate, and lipase.  UA with no hematuria or UTI.  Patient was handed off to Dr. Daniels at the end of my shift pending CTAP. She may benefit from urogyn follow up if hematuria/vaginal bleeding recurs. [CG]      ED Course User Index  [CG] Yesenia Aguilera MD         Diagnoses as of 06/09/24 1618   Vaginal bleeding       Disposition:   Handoff - Dr. Daniels. Details of clinical presentation, medical decision-making, pending evaluation and disposition were discussed with oncoming physician. Please see their transition of care note for details of further ED course.     ED Prescriptions    None         Yesenia Aguilera MD  EM/IM/Peds    This note was dictated by speech recognition. Minor errors in transcription may be present.     Yesenia Aguilera MD  06/09/24 1618

## 2024-06-09 NOTE — ED PROVIDER NOTES
The patient's case was signed out to me by the outgoing physician.  Per their instructions I followed up with a CT of the patient's abdomen and pelvis.  This demonstrated no significant no significant acute abnormalities only an umbilical hernia and hypodensities of the spleen but no abnormalities involving the patient's recent hysterectomy.    The patient was reassured.  I explained the patient that her workup is relatively unremarkable and I am comfortable with patient following up with OB/GYN as an outpatient.  She was instructed to return to the emergency department for any worsening symptoms particularly any worsening vaginal bleeding.  She expressed understanding and agreement.  The patient was then discharged home in improved condition.     Vishnu Daniels MD  06/20/24 8847

## 2024-06-13 ENCOUNTER — OFFICE VISIT (OUTPATIENT)
Dept: PRIMARY CARE | Facility: CLINIC | Age: 84
End: 2024-06-13
Payer: COMMERCIAL

## 2024-06-13 VITALS
RESPIRATION RATE: 16 BRPM | HEART RATE: 72 BPM | DIASTOLIC BLOOD PRESSURE: 70 MMHG | BODY MASS INDEX: 49.25 KG/M2 | SYSTOLIC BLOOD PRESSURE: 130 MMHG | WEIGHT: 278 LBS

## 2024-06-13 DIAGNOSIS — E89.0 POSTOPERATIVE HYPOTHYROIDISM: ICD-10-CM

## 2024-06-13 DIAGNOSIS — N18.32 STAGE 3B CHRONIC KIDNEY DISEASE (MULTI): ICD-10-CM

## 2024-06-13 DIAGNOSIS — I10 PRIMARY HYPERTENSION: ICD-10-CM

## 2024-06-13 DIAGNOSIS — E78.49 OTHER HYPERLIPIDEMIA: ICD-10-CM

## 2024-06-13 DIAGNOSIS — N88.8 BLEEDING OF CERVIX: Primary | ICD-10-CM

## 2024-06-13 PROCEDURE — 99214 OFFICE O/P EST MOD 30 MIN: CPT | Performed by: INTERNAL MEDICINE

## 2024-06-13 PROCEDURE — 3075F SYST BP GE 130 - 139MM HG: CPT | Performed by: INTERNAL MEDICINE

## 2024-06-13 PROCEDURE — 3078F DIAST BP <80 MM HG: CPT | Performed by: INTERNAL MEDICINE

## 2024-06-13 PROCEDURE — 1036F TOBACCO NON-USER: CPT | Performed by: INTERNAL MEDICINE

## 2024-06-13 NOTE — PROGRESS NOTES
Ana Hightower is a 83 y.o. female   Patient with a past medical history of Breast CA, HTN, HLD, CKD III, Papillary Thyroid CA s/p Hypothyroidism, CTS, Meningioma (MRI due in 2024), Osteoarthritis, Gout, Pedal edema, FAtty Liver, Mammogram (June)    Feels fine  No chest pain/  SOB/ dizziness  BM OK  Energy level ok  Appetite OK             Review of Systems     Constitutional: no fever, no chills, not feeling poorly, not feeling tired and no recent weight gain, no recent weight loss.   ENT: no earache, no hearing loss, no nosebleeds, no nasal discharge, no sore throat and no hoarseness.   Cardiovascular: the heart rate was not slow, the heart rate was not fast, no chest pain, no palpitations, no intermittent leg claudication   Respiratory: no cough, wheezing or shortness of breath at rest or exertion  Gastrointestinal: no abdominal pain, no constipation, no melena, no nausea, no diarrhea, no vomiting and no blood in stools.   Musculoskeletal: no arthralgias, no myalgias, no back pain, no joint swelling, no joint stiffness, no limb pain and no limb swelling.   Integumentary: no skin rashes, no skin lesions, no itching, no skin wound and no dry skin.   Neurological: no headache, no confusion, no numbness, no dizziness, no tingling and no fainting.   All other systems have been reviewed and are negative for complaint.       Vitals:    06/13/24 1154   BP: 130/70   Pulse: 72   Resp: 16        Physical Exam     Constitutional   General appearance: Alert and in no acute distress.     Pulmonary   Respiratory assessment: No respiratory distress, normal respiratory rhythm and effort.    Auscultation of Lungs: Clear bilateral breath sounds.   Cardiovascular   Auscultation of heart: Apical pulse normal, heart rate and rhythm normal, normal S1 and S2, no murmurs and no pericardial rub.    Exam for edema: 1 plus edema  Abdomen   Abdominal Exam: No bruits, normal bowel sounds, soft, non-tender, no abdominal mass palpated.     Liver and Spleen exam: No hepato-splenomegaly.   Musculoskeletal   Examination of gait: Normal.    Inspection of digits and nails: No clubbing or cyanosis of the fingernails.    Inspection/palpation of joints, bones and muscles: No joint swelling. Normal movement of all extremities.   Skin   Skin inspection: Normal skin color and pigmentation, normal skin turgor and no visible rash.   Neurologic   Cranial nerves: Nerves 2-12 were intact, no focal neuro defects.     Assessment/Plan          Patient with a past medical history of Breast CA, HTN, HLD, CKD III, Papillary Thyroid CA s/p Hypothyroidism, CTS, Meningioma (MRI due in 2024), Osteoarthritis, Gout, Pedal edema, FAtty Liver, Mammogram (June)     # HLD  Stable  Continue current medications  Watch salt, avoid excessive caffeine  Avoid processed meats/ sugars/juices Instead eat fresh fruit  Add walnuts and almonds to your diet  exercise 6 days a week for 30 minutes        # HTN/ CKD III  Stable  Continue current medications     # OA of knees  Rx for walker     # Hypothyroidism  Stable  Continue current medications      # Chronic Vertigo  Refill Meclizine    # was in ED with blood in urine  Blood on Cervix on their exam  Negative UA  Will refer to Gyn for an exam

## 2024-06-25 ENCOUNTER — APPOINTMENT (OUTPATIENT)
Dept: OBSTETRICS AND GYNECOLOGY | Facility: CLINIC | Age: 84
End: 2024-06-25
Payer: COMMERCIAL

## 2024-06-25 VITALS
DIASTOLIC BLOOD PRESSURE: 72 MMHG | WEIGHT: 272 LBS | SYSTOLIC BLOOD PRESSURE: 124 MMHG | BODY MASS INDEX: 48.2 KG/M2 | HEIGHT: 63 IN

## 2024-06-25 DIAGNOSIS — N95.2 VAGINAL ATROPHY: Primary | ICD-10-CM

## 2024-06-25 DIAGNOSIS — N93.9 VAGINAL BLEEDING: ICD-10-CM

## 2024-06-25 PROCEDURE — 3078F DIAST BP <80 MM HG: CPT | Performed by: OBSTETRICS & GYNECOLOGY

## 2024-06-25 PROCEDURE — 1126F AMNT PAIN NOTED NONE PRSNT: CPT | Performed by: OBSTETRICS & GYNECOLOGY

## 2024-06-25 PROCEDURE — 3074F SYST BP LT 130 MM HG: CPT | Performed by: OBSTETRICS & GYNECOLOGY

## 2024-06-25 PROCEDURE — 99203 OFFICE O/P NEW LOW 30 MIN: CPT | Performed by: OBSTETRICS & GYNECOLOGY

## 2024-06-25 PROCEDURE — 1036F TOBACCO NON-USER: CPT | Performed by: OBSTETRICS & GYNECOLOGY

## 2024-06-25 PROCEDURE — 1160F RVW MEDS BY RX/DR IN RCRD: CPT | Performed by: OBSTETRICS & GYNECOLOGY

## 2024-06-25 PROCEDURE — 1159F MED LIST DOCD IN RCRD: CPT | Performed by: OBSTETRICS & GYNECOLOGY

## 2024-06-25 ASSESSMENT — PAIN SCALES - GENERAL: PAINLEVEL: 0-NO PAIN

## 2024-06-25 NOTE — PROGRESS NOTES
"SUBJECTIVE    83 y.o.  Hysterectomy female presents for   Chief Complaint   Patient presents with    Vaginal Bleeding     New patient is here for abnormal bleeding.  Last pap:  per pt 10+ yrs ago  Last jazmin:  2023  cat 1  Last colon screen:  per pt 10+ yrs ago  Accepts chaperone.   Judith Perez LPN      Pt presents for evaluation of vaginal bleeding.  Pt reports that she had experienced some blood on her toilet paper as well as noted in the toilet bowl.  She presented to the ED where an exam showed an\"erythematous cervix.\"  UA was negative for blood.  A CT scan was just notable for \"prior hysterectomy.\" Her bleeding has since stopped.    Pt's history is notable for a previous hysterectomy-- she reports this was in her 30s. This was done for fibroids through a midline vertical incision.  She did follow up with PAP screening after her surgery.    She is not sexually active.    OB/GYN History  No LMP recorded. Patient has had a hysterectomy.    Social History     Substance and Sexual Activity   Sexual Activity Not Currently       OB History    Para Term  AB Living   2 2 2     2   SAB IAB Ectopic Multiple Live Births           2      # Outcome Date GA Lbr Ignacio/2nd Weight Sex Delivery Anes PTL Lv   2 Term 1964    M Vag-Spont   MALIK   1 Term 1959    M Vag-Spont   DEC       Past Medical History  She has a past medical history of Encounter for screening for malignant neoplasm of colon (2016), Epistaxis (2019), Osteoarthritis of knee, unspecified, Other conditions influencing health status (2014), Pain in unspecified hand (10/27/2014), Personal history of malignant neoplasm of thyroid (2014), Personal history of other diseases of the digestive system (2014), Personal history of other diseases of the nervous system and sense organs, Personal history of other specified conditions (05/10/2016), Trigger finger, unspecified finger (2015), and Unilateral primary " "osteoarthritis, right knee.    Surgical History  She has a past surgical history that includes Total thyroidectomy (07/30/2013); Knee surgery (07/30/2013); Hand tendon surgery (04/06/2015); Carpal tunnel release (04/06/2015); Total abdominal hysterectomy (03/31/2014); [place] lymph node biopsy (03/31/2014); and Other surgical history (03/31/2014).     Social History  She reports that she has never smoked. She has never been exposed to tobacco smoke. She has never used smokeless tobacco. She reports that she does not drink alcohol and does not use drugs.    Medications:  (Not in a hospital admission)      Screenings  Social Determinants of Health     Tobacco Use: Low Risk  (6/25/2024)    Patient History     Smoking Tobacco Use: Never     Smokeless Tobacco Use: Never     Passive Exposure: Never   Alcohol Use: Not on file   Financial Resource Strain: Not on File (8/25/2019)    Received from [facility]     Financial Resource Strain     Financial Resource Strain: 0   Food Insecurity: Not on File (8/25/2019)    Received from [facility]     Food Insecurity     Food: 0   Transportation Needs: Not on File (8/25/2019)    Received from [facility]     Transportation Needs   Physical Activity: Not on File (8/25/2019)    Received from [facility]     Physical Activity   Stress: Not on File (8/25/2019)    Received from [facility]     Stress   Social Connections: Not on File (8/25/2019)    Received from [facility]     Social Connections     Social Connections and Isolation: 0   Intimate Partner Violence: Not on file   Depression: Not at risk (2/15/2024)    PHQ-2     PHQ-2 Score: 0   Housing Stability: Not on File (8/25/2019)    Received from [facility]     Housing Stability   Utilities: Not on file   Digital Equity: Not on file   Health Literacy: Not on file         OBJECTIVE  Vitals:    06/25/24 1105   BP: 124/72   Weight: 123 kg (272 lb)   Height: 1.6 m (5' 3\")     Body mass index is 48.18 kg/m².     Chaperone: Present: yes  Physical Exam  Constitutional:       " Appearance: Normal appearance.   Genitourinary:      Right Labia: No rash, tenderness or lesions.     Left Labia: No tenderness, lesions or rash.     No vaginal discharge, ulceration or cuff induration.      Moderate vaginal atrophy present.       Right Adnexa: not tender, not full and no mass present.     Left Adnexa: not tender, not full and no mass present.     Cervix is absent.   Abdominal:      General: Abdomen is flat. There is no distension.      Tenderness: There is no abdominal tenderness. There is no guarding.   Neurological:      Mental Status: She is alert.     A cervix in NOT present      ASSESSMENT & PLAN  Problem List Items Addressed This Visit    None  Visit Diagnoses       Vaginal atrophy    -  Primary            Follow up: Reviewed findings and clarified the absence of a cervix for the patient.  Likely spotting form vaginal atrophy-- no bleeding today so intervention deferred. Pt to call if bleeding resumes.    Edgar Hinson MD  Obstetrics & Gynecology  06/25/24

## 2024-06-28 ENCOUNTER — APPOINTMENT (OUTPATIENT)
Dept: PRIMARY CARE | Facility: CLINIC | Age: 84
End: 2024-06-28
Payer: COMMERCIAL

## 2024-08-23 NOTE — PROGRESS NOTES
Ana Hightower female   1940 83 y.o.   [id number]      Chief Complaint  Breast cancer surveillance     History Of Present Illness  Ana Hightower is a 83 y.o. AA female  who was diagnosed with left breast cancer in . She underwent left partial mastectomy with sentinel node biopsy for a 0.6 cm infiltrating ductal cancer with 5 negative lymph nodes. Estrogen receptors were positive only at 3% and progesterone receptors were negative, ER-2/bia was negative. She completed radiation in 2010. She refused chemotherapy. Today, she is doing well and denies palpable masses, skin or nipple changes.       BREAST IMAGIN2024 Bilateral screening mammogram, BI-RADS Category 2      REPRODUCTIVE HISTORY: Onset of menses was at age 9 and onset of menopause in her late 40s. She is  2 para 2 with her first child born she was 18; she did not breast-feed. She took hormone replacement for approximately 4 years.       FAMILY CANCER HISTORY:    The only family history of breast cancer is in a niece diagnosed in her 60s  at 66. The patient has an older sister who had ovarian cancer diagnosed at 87  at 87.   A son with rectal cancer, diagnosed at 55   Father with lung cancer  Brother with colon cancer diagnosed in his late 50s  at 60     Surgical History  She has a past surgical history that includes Total thyroidectomy (2013); Knee surgery (2013); Hand tendon surgery (2015); Carpal tunnel release (2015); Total abdominal hysterectomy (2014); [place] lymph node biopsy (2014); Other surgical history (2014); Breast biopsy (Left, 2010); and Breast lumpectomy (Left, 2010).     Social History  She reports that she has never smoked. She has never been exposed to tobacco smoke. She has never used smokeless tobacco. She reports that she does not drink alcohol and does not use drugs.    Family History  Family History   Problem Relation  Name Age of Onset    Lung cancer Father      Ovarian cancer Sister      Pancreatic cancer Brother      Rectal cancer Son      Breast cancer Other maternal relatives         Allergies  Patient has no known allergies.    Medications  Current Outpatient Medications   Medication Instructions    amLODIPine (NORVASC) 10 mg, oral, Daily    cholecalciferol (Vitamin D-3) 50 MCG (2000 UT) tablet 1 tablet, oral, Daily    furosemide (LASIX) 20 mg, oral, Daily    levothyroxine (SYNTHROID, LEVOXYL) 125 mcg, oral, Daily    meclizine (ANTIVERT) 25 mg, oral, 3 times daily PRN    rosuvastatin (CRESTOR) 10 mg, oral, Daily    triamterene-hydrochlorothiazid (Maxzide) 75-50 mg tablet 1 tablet, oral, Daily         REVIEW OF SYSTEMS    Constitutional:  Negative for appetite change, fatigue, fever and unexpected weight change.   HENT:  Negative for ear pain, hearing loss, nosebleeds, sore throat and trouble swallowing.    Eyes:  Negative for discharge, itching and visual disturbance.   Respiratory:  Negative for cough, chest tightness and shortness of breath.    Cardiovascular:  Negative for chest pain, palpitations and leg swelling.   Breast: as indicated in HPI  Gastrointestinal:  Negative for abdominal pain, constipation, diarrhea and nausea.   Endocrine: Negative for cold intolerance and heat intolerance.   Genitourinary:  Negative for dysuria, frequency, hematuria, pelvic pain and vaginal bleeding.   Musculoskeletal:  Negative for arthralgias, back pain, gait problem, joint swelling and myalgias.   Skin:  Negative for color change and rash.   Allergic/Immunologic: Negative for environmental allergies and food allergies.   Neurological:  Negative for dizziness, tremors, speech difficulty, weakness, numbness and headaches.   Hematological:  Does not bruise/bleed easily.   Psychiatric/Behavioral:  Negative for agitation, dysphoric mood and sleep disturbance. The patient is not nervous/anxious.         Past Medical History  She has a past  medical history of Breast cancer (Multi), Encounter for screening for malignant neoplasm of colon (06/24/2016), Epistaxis (08/16/2019), Osteoarthritis of knee, unspecified, Other conditions influencing health status (09/12/2014), Pain in unspecified hand (10/27/2014), Personal history of irradiation, Personal history of malignant neoplasm of thyroid (03/31/2014), Personal history of other diseases of the digestive system (05/01/2014), Personal history of other diseases of the nervous system and sense organs, Personal history of other specified conditions (05/10/2016), Trigger finger, unspecified finger (04/06/2015), and Unilateral primary osteoarthritis, right knee.     Physical Exam  Patient is alert and oriented x3 and in a relaxed and appropriate mood. Her gait is steady and hand grasps are equal. Sclera is clear. The breasts are nearly symmetrical. Left breast well healed surgical incision with moderate radiation skin changes. Well healed left axillary incision. The tissue is soft without palpable abnormalities, discrete nodules or masses. The right breast skin and nipples appear normal. There is no cervical, supraclavicular or axillary lymphadenopathy. Heart rate and rhythm normal, S1 and S2 appreciated. The lungs are clear to auscultation bilaterally. Abdomen is soft and non-tender.       Physical Exam  Chest:              Last Recorded Vitals  Vitals:    08/29/24 1229   BP: 141/83   Pulse: 92   Resp: 18   Temp: 35.9 °C (96.6 °F)   SpO2: 95%       Relevant Results   Time was spent viewing digital images of the radiology testing with the patient. I explained the results in depth, along with suggested explanation for follow up recommendations based on the testing results. BI-RADS Category 2    Imaging  Interpreted By:  Jeff Fitzgerald,   STUDY:  BI MAMMO BILATERAL SCREENING TOMOSYNTHESIS;  8/29/2024 12:06 pm      ACCESSION NUMBER(S):  [id number]      ORDERING CLINICIAN:  FERNANDO FRY       INDICATION:  Screening. Left lumpectomy with radiation therapy.      COMPARISON:  08/24/2023 and 06/06/2022      FINDINGS:  2D and tomosynthesis images were reviewed at 1 mm slice thickness.      Density:  There are areas of scattered fibroglandular tissue.      Stable postsurgical scarring in the superomedial left breast at  posterior depth. No suspicious masses or calcifications are  identified bilaterally.      IMPRESSION:  No mammographic evidence of malignancy.      BI-RADS CATEGORY:      BI-RADS Category:  2 Benign.  Recommendation:  Annual Screening.  Recommended Date:  1 Year.  Laterality:  Bilateral.       Assessment/Plan       Your clinical examination and imaging are normal. Please return in one year for bilateral screening mammogram and office visit or sooner if you have any problems or concerns.     You can see your health information, review clinical summaries from office visits & test results online when you follow your health with MY  Chart, a personal health record. To sign up go to [email or web address]. If you need assistance with signing up or trouble getting into your account call [employer] Patient Line 24/7 at [phone].    My office phone number is [phone] if you need to get in touch with me or have additional questions or concerns. Thank you for choosing [facility] and trusting me as your healthcare provider. I look forward to seeing you again at your next office visit. I am honored to be a provider on your health care team and I remain dedicated to helping you achieve your health goals.      Brie Pride, SYLVIA-CNP

## 2024-08-26 ENCOUNTER — APPOINTMENT (OUTPATIENT)
Dept: RADIOLOGY | Facility: HOSPITAL | Age: 84
End: 2024-08-26
Payer: COMMERCIAL

## 2024-08-27 ENCOUNTER — APPOINTMENT (OUTPATIENT)
Dept: RADIOLOGY | Facility: HOSPITAL | Age: 84
End: 2024-08-27
Payer: COMMERCIAL

## 2024-08-27 ENCOUNTER — APPOINTMENT (OUTPATIENT)
Dept: SURGICAL ONCOLOGY | Facility: HOSPITAL | Age: 84
End: 2024-08-27
Payer: COMMERCIAL

## 2024-08-29 ENCOUNTER — HOSPITAL ENCOUNTER (OUTPATIENT)
Dept: RADIOLOGY | Facility: HOSPITAL | Age: 84
Discharge: HOME | End: 2024-08-29
Payer: COMMERCIAL

## 2024-08-29 ENCOUNTER — OFFICE VISIT (OUTPATIENT)
Dept: SURGICAL ONCOLOGY | Facility: HOSPITAL | Age: 84
End: 2024-08-29
Payer: COMMERCIAL

## 2024-08-29 VITALS
HEIGHT: 63 IN | HEART RATE: 92 BPM | OXYGEN SATURATION: 95 % | BODY MASS INDEX: 49.53 KG/M2 | DIASTOLIC BLOOD PRESSURE: 83 MMHG | RESPIRATION RATE: 18 BRPM | WEIGHT: 279.54 LBS | SYSTOLIC BLOOD PRESSURE: 141 MMHG | TEMPERATURE: 96.6 F

## 2024-08-29 VITALS — WEIGHT: 271.17 LBS | HEIGHT: 63 IN | BODY MASS INDEX: 48.05 KG/M2

## 2024-08-29 DIAGNOSIS — Z08 ENCOUNTER FOR FOLLOW-UP SURVEILLANCE OF BREAST CANCER: Primary | ICD-10-CM

## 2024-08-29 DIAGNOSIS — C50.919 MALIGNANT NEOPLASM OF FEMALE BREAST, UNSPECIFIED ESTROGEN RECEPTOR STATUS, UNSPECIFIED LATERALITY, UNSPECIFIED SITE OF BREAST (MULTI): ICD-10-CM

## 2024-08-29 DIAGNOSIS — Z12.31 ENCOUNTER FOR SCREENING MAMMOGRAM FOR MALIGNANT NEOPLASM OF BREAST: ICD-10-CM

## 2024-08-29 DIAGNOSIS — Z85.3 ENCOUNTER FOR FOLLOW-UP SURVEILLANCE OF BREAST CANCER: Primary | ICD-10-CM

## 2024-08-29 PROCEDURE — 3079F DIAST BP 80-89 MM HG: CPT

## 2024-08-29 PROCEDURE — 3077F SYST BP >= 140 MM HG: CPT

## 2024-08-29 PROCEDURE — 99214 OFFICE O/P EST MOD 30 MIN: CPT

## 2024-08-29 PROCEDURE — 1160F RVW MEDS BY RX/DR IN RCRD: CPT

## 2024-08-29 PROCEDURE — 1036F TOBACCO NON-USER: CPT

## 2024-08-29 PROCEDURE — 77063 BREAST TOMOSYNTHESIS BI: CPT | Performed by: STUDENT IN AN ORGANIZED HEALTH CARE EDUCATION/TRAINING PROGRAM

## 2024-08-29 PROCEDURE — 1159F MED LIST DOCD IN RCRD: CPT

## 2024-08-29 PROCEDURE — 77067 SCR MAMMO BI INCL CAD: CPT | Performed by: STUDENT IN AN ORGANIZED HEALTH CARE EDUCATION/TRAINING PROGRAM

## 2024-08-29 PROCEDURE — 77067 SCR MAMMO BI INCL CAD: CPT

## 2024-08-29 ASSESSMENT — PATIENT HEALTH QUESTIONNAIRE - PHQ9
2. FEELING DOWN, DEPRESSED OR HOPELESS: NOT AT ALL
SUM OF ALL RESPONSES TO PHQ9 QUESTIONS 1 AND 2: 0
1. LITTLE INTEREST OR PLEASURE IN DOING THINGS: NOT AT ALL

## 2024-08-29 NOTE — PATIENT INSTRUCTIONS
Your clinical examination and imaging are normal. Please return in one year for bilateral screening mammogram and office visit or sooner if you have any problems or concerns.     You can see your health information, review clinical summaries from office visits & test results online when you follow your health with MY  Chart, a personal health record. To sign up go to [email or web address]. If you need assistance with signing up or trouble getting into your account call [employer] Patient Line 24/7 at [phone].    My office phone number is [phone] if you need to get in touch with me or have additional questions or concerns. Thank you for choosing [facility] and trusting me as your healthcare provider. I look forward to seeing you again at your next office visit. I am honored to be a provider on your health care team and I remain dedicated to helping you achieve your health goals.

## 2024-09-17 ENCOUNTER — APPOINTMENT (OUTPATIENT)
Dept: PRIMARY CARE | Facility: CLINIC | Age: 84
End: 2024-09-17
Payer: COMMERCIAL

## 2024-10-11 ENCOUNTER — APPOINTMENT (OUTPATIENT)
Dept: PRIMARY CARE | Facility: CLINIC | Age: 84
End: 2024-10-11
Payer: COMMERCIAL

## 2024-10-11 VITALS
HEIGHT: 62 IN | BODY MASS INDEX: 51.89 KG/M2 | DIASTOLIC BLOOD PRESSURE: 70 MMHG | RESPIRATION RATE: 18 BRPM | SYSTOLIC BLOOD PRESSURE: 140 MMHG | HEART RATE: 72 BPM | WEIGHT: 282 LBS

## 2024-10-11 DIAGNOSIS — E89.0 POSTOPERATIVE HYPOTHYROIDISM: ICD-10-CM

## 2024-10-11 DIAGNOSIS — E78.49 OTHER HYPERLIPIDEMIA: ICD-10-CM

## 2024-10-11 DIAGNOSIS — I10 PRIMARY HYPERTENSION: Primary | ICD-10-CM

## 2024-10-11 PROCEDURE — 1159F MED LIST DOCD IN RCRD: CPT | Performed by: INTERNAL MEDICINE

## 2024-10-11 PROCEDURE — 3078F DIAST BP <80 MM HG: CPT | Performed by: INTERNAL MEDICINE

## 2024-10-11 PROCEDURE — 99214 OFFICE O/P EST MOD 30 MIN: CPT | Performed by: INTERNAL MEDICINE

## 2024-10-11 PROCEDURE — 1160F RVW MEDS BY RX/DR IN RCRD: CPT | Performed by: INTERNAL MEDICINE

## 2024-10-11 PROCEDURE — 3077F SYST BP >= 140 MM HG: CPT | Performed by: INTERNAL MEDICINE

## 2024-10-11 PROCEDURE — 1036F TOBACCO NON-USER: CPT | Performed by: INTERNAL MEDICINE

## 2024-10-11 NOTE — PROGRESS NOTES
Ana Hightower is a 83 y.o. female   Patient with a past medical history of Breast CA, HTN, HLD, CKD III, Papillary Thyroid CA s/p Hypothyroidism, CTS, Meningioma (MRI due in 2024), Osteoarthritis, Gout, Pedal edema, FAtty Liver, Mammogram (June)    Gets sinus congestion  Gets drainage  Also gets dry    Drinks coffee at night for headaches which helps as recommended by Neurology    No chest pain/  SOB/ dizziness  BM OK  Energy level ok  Appetite OK             Review of Systems     Constitutional: no fever, no chills, not feeling poorly, not feeling tired and no recent weight gain, no recent weight loss.   ENT: no earache, no hearing loss, no nosebleeds, no nasal discharge, no sore throat and no hoarseness.   Cardiovascular: the heart rate was not slow, the heart rate was not fast, no chest pain, no palpitations, no intermittent leg claudication and no lower extremity edema.   Respiratory: no cough, wheezing or shortness of breath at rest or exertion  Gastrointestinal: no abdominal pain, no constipation, no melena, no nausea, no diarrhea, no vomiting and no blood in stools.   Musculoskeletal: no arthralgias, no myalgias, no back pain, no joint swelling, no joint stiffness, no limb pain and no limb swelling.   Integumentary: no skin rashes, no skin lesions, no itching, no skin wound and no dry skin.   Neurological: no headache, no confusion, no numbness, no dizziness, no tingling and no fainting.   All other systems have been reviewed and are negative for complaint.     Current Outpatient Medications   Medication Instructions    amLODIPine (NORVASC) 10 mg, oral, Daily    cholecalciferol (Vitamin D-3) 50 MCG (2000 UT) tablet 1 tablet, oral, Daily    furosemide (LASIX) 20 mg, oral, Daily    levothyroxine (SYNTHROID, LEVOXYL) 125 mcg, oral, Daily    meclizine (ANTIVERT) 25 mg, oral, 3 times daily PRN    rosuvastatin (CRESTOR) 10 mg, oral, Daily    triamterene-hydrochlorothiazid (Maxzide) 75-50 mg tablet 1 tablet, oral,  Daily         Vitals:    10/11/24 1155   BP: 140/70   Pulse: 72   Resp: 18        Physical Exam     Constitutional   General appearance: Alert and in no acute distress.     Pulmonary   Respiratory assessment: No respiratory distress, normal respiratory rhythm and effort.    Auscultation of Lungs: Clear bilateral breath sounds.   Cardiovascular   Auscultation of heart: Apical pulse normal, heart rate and rhythm normal, normal S1 and S2, no murmurs and no pericardial rub.    Exam for edema: No peripheral edema.   Abdomen   Abdominal Exam: No bruits, normal bowel sounds, soft, non-tender, no abdominal mass palpated.    Liver and Spleen exam: No hepato-splenomegaly.   Musculoskeletal   Examination of gait: Normal.    Inspection of digits and nails: No clubbing or cyanosis of the fingernails.    Inspection/palpation of joints, bones and muscles: No joint swelling. Normal movement of all extremities.   Skin   Skin inspection: Normal skin color and pigmentation, normal skin turgor and no visible rash.   Neurologic   Cranial nerves: Nerves 2-12 were intact, no focal neuro defects.    Assessment/Plan            Patient with a past medical history of Breast CA, HTN, HLD, CKD III, Papillary Thyroid CA s/p Hypothyroidism, CTS, Meningioma (MRI due in 2024), Osteoarthritis, Gout, Pedal edema, FAtty Liver, Mammogram (June)     # HLD  Stable  Continue current medications  Watch salt, avoid excessive caffeine  Avoid processed meats/ sugars/juices Instead eat fresh fruit  Add walnuts and almonds to your diet  exercise 6 days a week for 30 minutes        # HTN/ CKD III  Stable  Continue current medications     # OA of knees  Doing well with walker     # Hypothyroidism  Stable  Continue current medications        # Chronic Vertigo  Stable    # Hypnic headaches  Try Excedrin migraine with caffeine

## 2024-10-25 ENCOUNTER — APPOINTMENT (OUTPATIENT)
Dept: NEUROLOGY | Facility: CLINIC | Age: 84
End: 2024-10-25
Payer: COMMERCIAL

## 2024-11-13 ENCOUNTER — APPOINTMENT (OUTPATIENT)
Dept: NEUROLOGY | Facility: HOSPITAL | Age: 84
End: 2024-11-13
Payer: COMMERCIAL

## 2025-01-23 ENCOUNTER — APPOINTMENT (OUTPATIENT)
Dept: NEUROLOGY | Facility: CLINIC | Age: 85
End: 2025-01-23
Payer: COMMERCIAL

## 2025-02-18 ENCOUNTER — APPOINTMENT (OUTPATIENT)
Dept: PRIMARY CARE | Facility: CLINIC | Age: 85
End: 2025-02-18
Payer: COMMERCIAL

## 2025-02-18 VITALS
BODY MASS INDEX: 51.58 KG/M2 | DIASTOLIC BLOOD PRESSURE: 80 MMHG | HEART RATE: 70 BPM | RESPIRATION RATE: 16 BRPM | SYSTOLIC BLOOD PRESSURE: 138 MMHG | WEIGHT: 282 LBS

## 2025-02-18 DIAGNOSIS — E78.49 OTHER HYPERLIPIDEMIA: ICD-10-CM

## 2025-02-18 DIAGNOSIS — D32.9 MENINGIOMA (MULTI): ICD-10-CM

## 2025-02-18 DIAGNOSIS — I10 PRIMARY HYPERTENSION: ICD-10-CM

## 2025-02-18 DIAGNOSIS — M17.0 PRIMARY OSTEOARTHRITIS OF BOTH KNEES: ICD-10-CM

## 2025-02-18 DIAGNOSIS — C73 PAPILLARY THYROID CARCINOMA (MULTI): ICD-10-CM

## 2025-02-18 DIAGNOSIS — E89.0 POSTOPERATIVE HYPOTHYROIDISM: ICD-10-CM

## 2025-02-18 DIAGNOSIS — N18.32 STAGE 3B CHRONIC KIDNEY DISEASE (MULTI): ICD-10-CM

## 2025-02-18 DIAGNOSIS — Z00.00 ROUTINE GENERAL MEDICAL EXAMINATION AT HEALTH CARE FACILITY: Primary | ICD-10-CM

## 2025-02-18 PROCEDURE — 1036F TOBACCO NON-USER: CPT | Performed by: INTERNAL MEDICINE

## 2025-02-18 PROCEDURE — 1160F RVW MEDS BY RX/DR IN RCRD: CPT | Performed by: INTERNAL MEDICINE

## 2025-02-18 PROCEDURE — G0444 DEPRESSION SCREEN ANNUAL: HCPCS | Performed by: INTERNAL MEDICINE

## 2025-02-18 PROCEDURE — 1123F ACP DISCUSS/DSCN MKR DOCD: CPT | Performed by: INTERNAL MEDICINE

## 2025-02-18 PROCEDURE — 3075F SYST BP GE 130 - 139MM HG: CPT | Performed by: INTERNAL MEDICINE

## 2025-02-18 PROCEDURE — 1158F ADVNC CARE PLAN TLK DOCD: CPT | Performed by: INTERNAL MEDICINE

## 2025-02-18 PROCEDURE — G0439 PPPS, SUBSEQ VISIT: HCPCS | Performed by: INTERNAL MEDICINE

## 2025-02-18 PROCEDURE — 1159F MED LIST DOCD IN RCRD: CPT | Performed by: INTERNAL MEDICINE

## 2025-02-18 PROCEDURE — 99214 OFFICE O/P EST MOD 30 MIN: CPT | Performed by: INTERNAL MEDICINE

## 2025-02-18 PROCEDURE — 3079F DIAST BP 80-89 MM HG: CPT | Performed by: INTERNAL MEDICINE

## 2025-02-18 RX ORDER — FUROSEMIDE 20 MG/1
20 TABLET ORAL DAILY
Qty: 90 TABLET | Refills: 0 | Status: SHIPPED | OUTPATIENT
Start: 2025-02-18

## 2025-02-18 RX ORDER — AMLODIPINE BESYLATE 10 MG/1
10 TABLET ORAL DAILY
Qty: 90 TABLET | Refills: 2 | Status: SHIPPED | OUTPATIENT
Start: 2025-02-18

## 2025-02-18 RX ORDER — ROSUVASTATIN CALCIUM 10 MG/1
10 TABLET, COATED ORAL DAILY
Qty: 90 TABLET | Refills: 2 | Status: SHIPPED | OUTPATIENT
Start: 2025-02-18

## 2025-02-18 RX ORDER — TRIAMTERENE AND HYDROCHLOROTHIAZIDE 75; 50 MG/1; MG/1
1 TABLET ORAL DAILY
Qty: 90 TABLET | Refills: 2 | Status: SHIPPED | OUTPATIENT
Start: 2025-02-18

## 2025-02-18 RX ORDER — LEVOTHYROXINE SODIUM 125 UG/1
125 TABLET ORAL DAILY
Qty: 90 TABLET | Refills: 2 | Status: SHIPPED | OUTPATIENT
Start: 2025-02-18

## 2025-02-18 ASSESSMENT — COLUMBIA-SUICIDE SEVERITY RATING SCALE - C-SSRS
1. IN THE PAST MONTH, HAVE YOU WISHED YOU WERE DEAD OR WISHED YOU COULD GO TO SLEEP AND NOT WAKE UP?: NO
6. HAVE YOU EVER DONE ANYTHING, STARTED TO DO ANYTHING, OR PREPARED TO DO ANYTHING TO END YOUR LIFE?: NO
2. HAVE YOU ACTUALLY HAD ANY THOUGHTS OF KILLING YOURSELF?: NO

## 2025-02-18 ASSESSMENT — ENCOUNTER SYMPTOMS
DEPRESSION: 0
LOSS OF SENSATION IN FEET: 0
OCCASIONAL FEELINGS OF UNSTEADINESS: 0

## 2025-02-18 NOTE — PROGRESS NOTES
"Ana Hightower is a 84 y.o. female here for a Medicare Wellness Exam.    No chief complaint on file.       Patient with a past medical history of Breast CA, HTN, HLD, CKD III, Papillary Thyroid CA s/p Hypothyroidism, CTS, Meningioma (MRI due in 2024), Osteoarthritis, Gout, Pedal edema, FAtty Liver, Mammogram (June)    Feels fine  No chest pain/  SOB/ dizziness  BM OK  Energy level ok  Appetite OK             Medicare Wellness Exam    The patent is being seen for a follow up annual wellness visit  Past Medical, Surgical and family History: Reviewed and updated in chart  Interval History: Patient has not been hospitalized previously  Medications and Supplements: Review of all medications by a prescribing practitioner or clinical pharmacist (such as prescriptions, OTC, Herbal therapies and supplements) documented in the medical record.    Patient Self-Assessment of health: Good  Tobacco Use: No  Alcohol Use: No  Illicit drug use: No  Patient using opioids: No    Current Diet: in general, a \"healthy\" diet    Adequate fluid intake: Yes  Caffeine intake: Yes  Exercise frequency: not active    Depression/Suicide screening: PHQ2/ PHQ9 (see screenings tab)    Hearing impairment: No  Uses hearing aids N/A  Cognitive impairment Observation: No   Patient or family reported cognitive impairment: No    Bathing: independent  Dressing: independent  Walking: independent  Taking Medications: independent  Feeding: independent  Personal Hygiene: independent  Managing Finances: independent  Shopping: independent  Housework/Basic Home Maintenance: independent  Handling transportation: dependant  Preparing meals: independent    Bowels: continent  Bladder: incontinent    Falls Risk: has notfallen in last 6 months.   Their fall has not resulted in an injury.   Fall risk Factors: Fall Risk Factors: Antihypertensive Use mild   Care Plan Risk: Care Plan: High Risk: Regular physical activity such as walking, water aerobics or anton chi to " improve strength, balance, coordination and flexibility. Wear appropriate, sensible shoe wear. Remove fall hazards at home such as loose rugs, obstacles, use non-slip surface in bath or shower. Keep living space well lit. Use assistive devices such as cane or walker if recommended and at home use handrails on stairs, grab bars for shower or tub, raised toilet seat and seat in the shower or tub.       Home Safety Risk Factors: Home Safety Risk Factors: None  Advanced Directives:  Living will: Yes POA: Yes    Patient's End of Life Decisions: Provider agree to follow.      Past Medical History:   Diagnosis Date    Breast cancer (Multi)     Encounter for screening for malignant neoplasm of colon 06/24/2016    Encounter for screening colonoscopy    Epistaxis 08/16/2019    Epistaxis, recurrent    Osteoarthritis of knee, unspecified     Osteoarthritis of knee    Other conditions influencing health status 09/12/2014    Normal colonoscopy    Pain in unspecified hand 10/27/2014    Hand pain    Personal history of irradiation     Personal history of malignant neoplasm of thyroid 03/31/2014    History of malignant neoplasm of thyroid    Personal history of other diseases of the digestive system 05/01/2014    History of angular cheilitis    Personal history of other diseases of the nervous system and sense organs     History of carpal tunnel syndrome    Personal history of other specified conditions 05/10/2016    History of insomnia    Trigger finger, unspecified finger 04/06/2015    Triggering of finger    Unilateral primary osteoarthritis, right knee     Primary osteoarthritis of right knee        Current Outpatient Medications   Medication Instructions    amLODIPine (NORVASC) 10 mg, oral, Daily    cholecalciferol (Vitamin D-3) 50 MCG (2000 UT) tablet 1 tablet, oral, Daily    furosemide (LASIX) 20 mg, oral, Daily    levothyroxine (SYNTHROID, LEVOXYL) 125 mcg, oral, Daily    meclizine (ANTIVERT) 25 mg, oral, 3 times daily PRN     rosuvastatin (CRESTOR) 10 mg, oral, Daily    triamterene-hydrochlorothiazid (Maxzide) 75-50 mg tablet 1 tablet, oral, Daily       Review of Systems     Constitutional: no fever, no chills, not feeling poorly, not feeling tired and no recent weight gain, no recent weight loss.   ENT: no earache, no hearing loss, no nosebleeds, no nasal discharge, no sore throat and no hoarseness.   Cardiovascular: the heart rate was not slow, the heart rate was not fast, no chest pain, no palpitations, no intermittent leg claudication and no lower extremity edema.   Respiratory: no cough, wheezing or shortness of breath at rest or exertion  Gastrointestinal: no abdominal pain, no constipation, no melena, no nausea, no diarrhea, no vomiting and no blood in stools.   Musculoskeletal: no arthralgias, no myalgias, no back pain, no joint swelling, no joint stiffness, no limb pain and no limb swelling.   Integumentary: no skin rashes, no skin lesions, no itching, no skin wound and no dry skin.   Neurological: no headache, no confusion, no numbness, no dizziness, no tingling and no fainting.   All other systems have been reviewed and are negative for complaint.        Physical Exam    Constitutional   General appearance: Alert and in no acute distress.     Pulmonary   Respiratory assessment: No respiratory distress, normal respiratory rhythm and effort.    Auscultation of Lungs: Clear bilateral breath sounds.   Cardiovascular   Auscultation of heart: Apical pulse normal, heart rate and rhythm normal, normal S1 and S2, no murmurs and no pericardial rub.    Exam for edema: No peripheral edema.   Abdomen   Abdominal Exam: No bruits, normal bowel sounds, soft, non-tender, no abdominal mass palpated.    Liver and Spleen exam: No hepato-splenomegaly.   Musculoskeletal   Examination of gait: Normal.    Inspection of digits and nails: No clubbing or cyanosis of the fingernails.    Inspection/palpation of joints, bones and muscles: No joint  swelling. Normal movement of all extremities.   Skin   Skin inspection: Normal skin color and pigmentation, normal skin turgor and no visible rash.   Neurologic   Cranial nerves: Nerves 2-12 were intact, no focal neuro defects.       Assessment/Plan          Patient with a past medical history of Breast CA, HTN, HLD, CKD III, Papillary Thyroid CA s/p Hypothyroidism, CTS, Meningioma (MRI due in 2024), Osteoarthritis, Gout, Pedal edema, FAtty Liver, Mammogram (June)     # HLD  Stable  Continue current medications  Watch salt, avoid excessive caffeine  Avoid processed meats/ sugars/juices Instead eat fresh fruit  Add walnuts and almonds to your diet  exercise 6 days a week for 30 minutes        # HTN/ CKD III  Stable  Continue current medications     # OA of knees  Doing well with walker  Refer to Ortho for consideration for gel injection     # Hypothyroidism  Stable  Continue current medications        # Chronic Vertigo  Stable     # Meningioma  MRI for follow up      Advance Directives Discussion  less than 30 minutes spent discussing Advanced Care Planning (including a Living Will, Healthcare POA, as well as specific end of life choices and/or directives). The details of that discussion were documented in Advanced Directives Discussion section of the medical record.         Depression Screening  5__ minutes  were spent screening for depression using PHQ2/PHQ9 as documented in the chart.     Alcohol Screening  __1 minutes were spent screening for alcohol use/misuse as documented in the chart.       Obesity Counseling  _1_ minutes were spent counseling on diet and exercise interventions to address obesity and weight reduction.              Cardiac Risk Assessment  __minutes were spent assessing and discussing cardiovascular risk was and, if needed, lifestyle modifications recommended, including nutritional choices, exercise, and elimination of habits contributing to risk. Aspirin use/disuse was discussed following the  guidelines below.     Low dose ASA ( mg) should be considered:  If you have prior Heart Attack/Stroke/Peripheral vascular disease:  Generally recommend daily low dose aspirin unless extremely high bleeding risk (e.g., gastrointestinal).     If you do not have prior Heart Attack/Stroke/Peripheral vascular disease:    Age < 70 and your 10-year cardiovascular disease risk is >20%, use low dose Aspirin   Age >=70: Do not use Aspirin for prevention  Low Dose CT Screening  We discussed the pros and cons of low dose CT screening for lung cancer based on the patient's smoking history.  This screening is recommended for patients who:  Are between the age of 50 to 77  Have a 20 pack-year smoking history (20 years of smoking a pack a day)  Are either a current smoker or have quit smoking within the last 15 years  Are in good health - no new cough or unexplained weight loss  Are willing to do the follow-up testing and treatment, if needed  Have not had a chest CT (CAT) scan in the last year

## 2025-02-22 LAB
ALBUMIN SERPL-MCNC: 4.5 G/DL (ref 3.6–5.1)
ALP SERPL-CCNC: 123 U/L (ref 37–153)
ALT SERPL-CCNC: 11 U/L (ref 6–29)
ANION GAP SERPL CALCULATED.4IONS-SCNC: 9 MMOL/L (CALC) (ref 7–17)
AST SERPL-CCNC: 16 U/L (ref 10–35)
BILIRUB SERPL-MCNC: 0.5 MG/DL (ref 0.2–1.2)
BUN SERPL-MCNC: 20 MG/DL (ref 7–25)
CALCIUM SERPL-MCNC: 9.8 MG/DL (ref 8.6–10.4)
CHLORIDE SERPL-SCNC: 102 MMOL/L (ref 98–110)
CHOLEST SERPL-MCNC: 210 MG/DL
CHOLEST/HDLC SERPL: 2.4 (CALC)
CO2 SERPL-SCNC: 29 MMOL/L (ref 20–32)
CREAT SERPL-MCNC: 0.92 MG/DL (ref 0.6–0.95)
EGFRCR SERPLBLD CKD-EPI 2021: 61 ML/MIN/1.73M2
ERYTHROCYTE [DISTWIDTH] IN BLOOD BY AUTOMATED COUNT: 13.4 % (ref 11–15)
GLUCOSE SERPL-MCNC: 112 MG/DL (ref 65–99)
HCT VFR BLD AUTO: 43.5 % (ref 35–45)
HDLC SERPL-MCNC: 89 MG/DL
HGB BLD-MCNC: 13.7 G/DL (ref 11.7–15.5)
LDLC SERPL CALC-MCNC: 99 MG/DL (CALC)
MCH RBC QN AUTO: 27.6 PG (ref 27–33)
MCHC RBC AUTO-ENTMCNC: 31.5 G/DL (ref 32–36)
MCV RBC AUTO: 87.5 FL (ref 80–100)
NONHDLC SERPL-MCNC: 121 MG/DL (CALC)
PLATELET # BLD AUTO: 357 THOUSAND/UL (ref 140–400)
PMV BLD REES-ECKER: 10.4 FL (ref 7.5–12.5)
POTASSIUM SERPL-SCNC: 4.9 MMOL/L (ref 3.5–5.3)
PROT SERPL-MCNC: 7.8 G/DL (ref 6.1–8.1)
RBC # BLD AUTO: 4.97 MILLION/UL (ref 3.8–5.1)
SODIUM SERPL-SCNC: 140 MMOL/L (ref 135–146)
THYROGLOB AB SERPL-ACNC: <1 IU/ML
THYROGLOB SERPL-MCNC: 37.8 NG/ML
TRIGL SERPL-MCNC: 120 MG/DL
TSH SERPL-ACNC: 1.3 MIU/L (ref 0.4–4.5)
WBC # BLD AUTO: 6.2 THOUSAND/UL (ref 3.8–10.8)

## 2025-03-03 ENCOUNTER — HOSPITAL ENCOUNTER (OUTPATIENT)
Dept: RADIOLOGY | Facility: HOSPITAL | Age: 85
Discharge: HOME | End: 2025-03-03
Payer: COMMERCIAL

## 2025-03-03 DIAGNOSIS — M25.561 CHRONIC PAIN OF RIGHT KNEE: Primary | ICD-10-CM

## 2025-03-03 DIAGNOSIS — D32.9 MENINGIOMA (MULTI): ICD-10-CM

## 2025-03-03 DIAGNOSIS — G89.29 CHRONIC PAIN OF RIGHT KNEE: Primary | ICD-10-CM

## 2025-03-03 PROCEDURE — 2550000001 HC RX 255 CONTRASTS: Performed by: INTERNAL MEDICINE

## 2025-03-03 PROCEDURE — 70553 MRI BRAIN STEM W/O & W/DYE: CPT

## 2025-03-03 PROCEDURE — A9575 INJ GADOTERATE MEGLUMI 0.1ML: HCPCS | Performed by: INTERNAL MEDICINE

## 2025-03-03 RX ORDER — GADOTERATE MEGLUMINE 376.9 MG/ML
15 INJECTION INTRAVENOUS
Status: COMPLETED | OUTPATIENT
Start: 2025-03-03 | End: 2025-03-03

## 2025-03-03 RX ORDER — GADOTERATE MEGLUMINE 376.9 MG/ML
15 INJECTION INTRAVENOUS
Status: DISCONTINUED | OUTPATIENT
Start: 2025-03-03 | End: 2025-03-04 | Stop reason: HOSPADM

## 2025-03-03 RX ADMIN — GADOTERATE MEGLUMINE 25 ML: 376.9 INJECTION INTRAVENOUS at 12:53

## 2025-03-06 ENCOUNTER — APPOINTMENT (OUTPATIENT)
Dept: RADIOLOGY | Facility: HOSPITAL | Age: 85
End: 2025-03-06
Payer: COMMERCIAL

## 2025-03-06 ENCOUNTER — APPOINTMENT (OUTPATIENT)
Dept: ORTHOPEDIC SURGERY | Facility: HOSPITAL | Age: 85
End: 2025-03-06
Payer: COMMERCIAL

## 2025-03-06 DIAGNOSIS — M25.561 ACUTE PAIN OF RIGHT KNEE: ICD-10-CM

## 2025-03-17 ENCOUNTER — APPOINTMENT (OUTPATIENT)
Dept: PRIMARY CARE | Facility: CLINIC | Age: 85
End: 2025-03-17
Payer: COMMERCIAL

## 2025-03-17 VITALS
HEART RATE: 70 BPM | TEMPERATURE: 97.7 F | BODY MASS INDEX: 49.26 KG/M2 | WEIGHT: 278 LBS | HEIGHT: 63 IN | SYSTOLIC BLOOD PRESSURE: 130 MMHG | RESPIRATION RATE: 16 BRPM | DIASTOLIC BLOOD PRESSURE: 80 MMHG

## 2025-03-17 DIAGNOSIS — E89.0 POSTOPERATIVE HYPOTHYROIDISM: ICD-10-CM

## 2025-03-17 DIAGNOSIS — E78.49 OTHER HYPERLIPIDEMIA: ICD-10-CM

## 2025-03-17 DIAGNOSIS — R10.12 LEFT UPPER QUADRANT ABDOMINAL PAIN: ICD-10-CM

## 2025-03-17 DIAGNOSIS — I10 PRIMARY HYPERTENSION: Primary | ICD-10-CM

## 2025-03-17 PROCEDURE — 1160F RVW MEDS BY RX/DR IN RCRD: CPT | Performed by: INTERNAL MEDICINE

## 2025-03-17 PROCEDURE — 3079F DIAST BP 80-89 MM HG: CPT | Performed by: INTERNAL MEDICINE

## 2025-03-17 PROCEDURE — G2211 COMPLEX E/M VISIT ADD ON: HCPCS | Performed by: INTERNAL MEDICINE

## 2025-03-17 PROCEDURE — 1159F MED LIST DOCD IN RCRD: CPT | Performed by: INTERNAL MEDICINE

## 2025-03-17 PROCEDURE — 3075F SYST BP GE 130 - 139MM HG: CPT | Performed by: INTERNAL MEDICINE

## 2025-03-17 PROCEDURE — 99214 OFFICE O/P EST MOD 30 MIN: CPT | Performed by: INTERNAL MEDICINE

## 2025-03-17 PROCEDURE — 1036F TOBACCO NON-USER: CPT | Performed by: INTERNAL MEDICINE

## 2025-03-17 NOTE — PROGRESS NOTES
Ana Hightower is a 84 y.o. female   Patient with a past medical history of Breast CA, HTN, HLD, CKD III, Papillary Thyroid CA s/p Hypothyroidism, CTS, Meningioma (MRI due in 2026), Osteoarthritis, Gout, Pedal edema, FAtty Liver, Mammogram (June)     Gets left-sided abdominal pain  No relation to food  Sometimes feels a good movement  No change in bowel habits    No blood in stool or black-colored stool    No nausea vomiting      No chest pain/  SOB/ dizziness  BM OK  Energy level ok  Appetite OK             Review of Systems     Constitutional: no fever, no chills, not feeling poorly, not feeling tired and no recent weight gain, no recent weight loss.   ENT: no earache, no hearing loss, no nosebleeds, no nasal discharge, no sore throat and no hoarseness.   Cardiovascular: the heart rate was not slow, the heart rate was not fast, no chest pain, no palpitations, no intermittent leg claudication and no lower extremity edema.   Respiratory: no cough, wheezing or shortness of breath at rest or exertion  Gastrointestinal: no abdominal pain, no constipation, no melena, no nausea, no diarrhea, no vomiting and no blood in stools.   Musculoskeletal: no arthralgias, no myalgias, no back pain, no joint swelling, no joint stiffness, no limb pain and no limb swelling.   Integumentary: no skin rashes, no skin lesions, no itching, no skin wound and no dry skin.   Neurological: no headache, no confusion, no numbness, no dizziness, no tingling and no fainting.   All other systems have been reviewed and are negative for complaint.     Current Outpatient Medications   Medication Instructions    amLODIPine (NORVASC) 10 mg, oral, Daily    cholecalciferol (Vitamin D-3) 50 MCG (2000 UT) tablet 1 tablet, oral, Daily    furosemide (LASIX) 20 mg, oral, Daily    levothyroxine (SYNTHROID, LEVOXYL) 125 mcg, oral, Daily    meclizine (ANTIVERT) 25 mg, oral, 3 times daily PRN    rosuvastatin (CRESTOR) 10 mg, oral, Daily     triamterene-hydrochlorothiazid (Maxzide) 75-50 mg tablet 1 tablet, oral, Daily         Vitals:    03/17/25 1120   BP: 130/80   Pulse: 70   Resp: 16   Temp: 36.5 °C (97.7 °F)        Physical Exam     Constitutional   General appearance: Alert and in no acute distress.     Pulmonary   Respiratory assessment: No respiratory distress, normal respiratory rhythm and effort.    Auscultation of Lungs: Clear bilateral breath sounds.   Cardiovascular   Auscultation of heart: Apical pulse normal, heart rate and rhythm normal, normal S1 and S2, no murmurs and no pericardial rub.    Exam for edema: No peripheral edema.   Abdomen   Abdominal Exam: No bruits, normal bowel sounds, soft, non-tender, no abdominal mass palpated.    Liver and Spleen exam: No hepato-splenomegaly.   Musculoskeletal   Examination of gait: Normal.    Inspection of digits and nails: No clubbing or cyanosis of the fingernails.    Inspection/palpation of joints, bones and muscles: No joint swelling. Normal movement of all extremities.   Skin   Skin inspection: Normal skin color and pigmentation, normal skin turgor and no visible rash.   Neurologic   Cranial nerves: Nerves 2-12 were intact, no focal neuro defects.    Assessment/Plan            Patient with a past medical history of Breast CA, HTN, HLD, CKD III, Papillary Thyroid CA s/p Hypothyroidism, CTS, Meningioma (MRI due in 2026), Osteoarthritis, Gout, Pedal edema, FAtty Liver, Mammogram (June)    # LUQ pain  Previous CT reviewed from 2024  Repeat      # HLD  Stable  Continue current medications  Watch salt, avoid excessive caffeine  Avoid processed meats/ sugars/juices Instead eat fresh fruit  Add walnuts and almonds to your diet  exercise 6 days a week for 30 minutes        # HTN/ CKD III  Stable  Continue current medications     # OA of knees  Doing well with walker  Refer to Ortho for consideration for gel injection     # Hypothyroidism  Stable  Continue current medications        # Chronic  Vertigo  Stable     # Meningioma  MRI is stable

## 2025-03-19 ENCOUNTER — APPOINTMENT (OUTPATIENT)
Dept: ORTHOPEDIC SURGERY | Facility: CLINIC | Age: 85
End: 2025-03-19
Payer: COMMERCIAL

## 2025-03-19 ENCOUNTER — HOSPITAL ENCOUNTER (OUTPATIENT)
Dept: RADIOLOGY | Facility: CLINIC | Age: 85
End: 2025-03-19
Payer: COMMERCIAL

## 2025-03-28 ENCOUNTER — HOSPITAL ENCOUNTER (OUTPATIENT)
Dept: RADIOLOGY | Facility: HOSPITAL | Age: 85
Discharge: HOME | End: 2025-03-28
Payer: COMMERCIAL

## 2025-03-28 DIAGNOSIS — R10.12 LEFT UPPER QUADRANT ABDOMINAL PAIN: ICD-10-CM

## 2025-03-28 PROCEDURE — 2550000001 HC RX 255 CONTRASTS: Performed by: INTERNAL MEDICINE

## 2025-03-28 PROCEDURE — 74177 CT ABD & PELVIS W/CONTRAST: CPT

## 2025-03-28 RX ADMIN — IOHEXOL 75 ML: 350 INJECTION, SOLUTION INTRAVENOUS at 14:49

## 2025-04-01 DIAGNOSIS — D73.4 SPLENIC CYST: Primary | ICD-10-CM

## 2025-04-08 ENCOUNTER — HOSPITAL ENCOUNTER (OUTPATIENT)
Dept: RADIOLOGY | Facility: CLINIC | Age: 85
Discharge: HOME | End: 2025-04-08
Payer: COMMERCIAL

## 2025-04-08 DIAGNOSIS — D73.4 SPLENIC CYST: ICD-10-CM

## 2025-04-08 PROCEDURE — 76705 ECHO EXAM OF ABDOMEN: CPT

## 2025-04-15 ENCOUNTER — TELEPHONE (OUTPATIENT)
Dept: PRIMARY CARE | Facility: CLINIC | Age: 85
End: 2025-04-15

## 2025-04-15 NOTE — TELEPHONE ENCOUNTER
Patient still want to speak with you regarding her ultrasound she still having issues please give her a call

## 2025-04-28 DIAGNOSIS — R10.12 LEFT UPPER QUADRANT ABDOMINAL PAIN: Primary | ICD-10-CM

## 2025-04-28 NOTE — TELEPHONE ENCOUNTER
PATIENT IS STILL HAVING ISSUES WITH THE PAIN IN HER SIDE SHE WANT TO KNOW IF YOU CAN REFER HER TO A GASTRO DOCTOR SHE BELIEVE IT'S NOTHING ELSE THAT YOU CAN REALLY DO

## 2025-05-20 ENCOUNTER — APPOINTMENT (OUTPATIENT)
Dept: PRIMARY CARE | Facility: CLINIC | Age: 85
End: 2025-05-20
Payer: COMMERCIAL

## 2025-05-21 ENCOUNTER — APPOINTMENT (OUTPATIENT)
Dept: NEUROLOGY | Facility: HOSPITAL | Age: 85
End: 2025-05-21
Payer: COMMERCIAL

## 2025-06-24 ENCOUNTER — APPOINTMENT (OUTPATIENT)
Dept: PRIMARY CARE | Facility: CLINIC | Age: 85
End: 2025-06-24
Payer: COMMERCIAL

## 2025-06-24 VITALS
BODY MASS INDEX: 49.03 KG/M2 | HEART RATE: 70 BPM | RESPIRATION RATE: 16 BRPM | TEMPERATURE: 98.4 F | DIASTOLIC BLOOD PRESSURE: 80 MMHG | SYSTOLIC BLOOD PRESSURE: 130 MMHG | WEIGHT: 276.8 LBS

## 2025-06-24 DIAGNOSIS — Z12.31 SCREENING MAMMOGRAM, ENCOUNTER FOR: Primary | ICD-10-CM

## 2025-06-24 DIAGNOSIS — E89.0 POSTOPERATIVE HYPOTHYROIDISM: ICD-10-CM

## 2025-06-24 DIAGNOSIS — I10 PRIMARY HYPERTENSION: ICD-10-CM

## 2025-06-24 DIAGNOSIS — E78.49 OTHER HYPERLIPIDEMIA: ICD-10-CM

## 2025-06-24 DIAGNOSIS — R73.9 HYPERGLYCEMIA: ICD-10-CM

## 2025-06-24 PROCEDURE — G2211 COMPLEX E/M VISIT ADD ON: HCPCS | Performed by: INTERNAL MEDICINE

## 2025-06-24 PROCEDURE — 99214 OFFICE O/P EST MOD 30 MIN: CPT | Performed by: INTERNAL MEDICINE

## 2025-06-24 PROCEDURE — 3075F SYST BP GE 130 - 139MM HG: CPT | Performed by: INTERNAL MEDICINE

## 2025-06-24 PROCEDURE — 3079F DIAST BP 80-89 MM HG: CPT | Performed by: INTERNAL MEDICINE

## 2025-06-24 NOTE — PROGRESS NOTES
Ana Hightower is a 84 y.o. female   Patient with a past medical history of Breast CA, HTN, HLD, CKD III, Papillary Thyroid CA s/p Hypothyroidism, CTS, Meningioma (MRI due in 2026), Osteoarthritis, Gout, Pedal edema, FAtty Liver, Mammogram (June)       Feels fine  No chest pain/  SOB/ dizziness  BM OK  Energy level ok  Appetite OK               Review of Systems     Constitutional: no fever, no chills, not feeling poorly, not feeling tired and no recent weight gain, no recent weight loss.   ENT: no earache, no hearing loss, no nosebleeds, no nasal discharge, no sore throat and no hoarseness.   Cardiovascular: the heart rate was not slow, the heart rate was not fast, no chest pain, no palpitations, no intermittent leg claudication and no lower extremity edema.   Respiratory: no cough, wheezing or shortness of breath at rest or exertion  Gastrointestinal: no abdominal pain, no constipation, no melena, no nausea, no diarrhea, no vomiting and no blood in stools.   Musculoskeletal: no arthralgias, no myalgias, no back pain, no joint swelling, no joint stiffness, no limb pain and no limb swelling.   Integumentary: no skin rashes, no skin lesions, no itching, no skin wound and no dry skin.   Neurological: no headache, no confusion, no numbness, no dizziness, no tingling and no fainting.   All other systems have been reviewed and are negative for complaint.     Current Outpatient Medications   Medication Instructions    amLODIPine (NORVASC) 10 mg, oral, Daily    cholecalciferol (Vitamin D-3) 50 MCG (2000 UT) tablet 1 tablet, oral, Daily    furosemide (LASIX) 20 mg, oral, Daily    levothyroxine (SYNTHROID, LEVOXYL) 125 mcg, oral, Daily    meclizine (ANTIVERT) 25 mg, oral, 3 times daily PRN    rosuvastatin (CRESTOR) 10 mg, oral, Daily    triamterene-hydrochlorothiazid (Maxzide) 75-50 mg tablet 1 tablet, oral, Daily         Vitals:    06/24/25 1215   BP: 130/80   Pulse: 70   Resp: 16   Temp: 36.9 °C (98.4 °F)           Physical Exam     Constitutional   General appearance: Alert and in no acute distress.   Obese  Pulmonary   Respiratory assessment: No respiratory distress, normal respiratory rhythm and effort.    Auscultation of Lungs: Clear bilateral breath sounds.   Cardiovascular   Auscultation of heart: Apical pulse normal, heart rate and rhythm normal, normal S1 and S2, no murmurs and no pericardial rub.    Exam for edema: trace peripheral edema.   Abdomen   Abdominal Exam: No bruits, normal bowel sounds, soft, non-tender, no abdominal mass palpated.    Liver and Spleen exam: No hepato-splenomegaly.   Musculoskeletal   Examination of gait: using a rolator  Inspection of digits and nails: No clubbing or cyanosis of the fingernails.    Inspection/palpation of joints, bones and muscles: No joint swelling. Normal movement of all extremities.   Skin   Skin inspection: Normal skin color and pigmentation, normal skin turgor and no visible rash.   Neurologic   Cranial nerves: Nerves 2-12 were intact, no focal neuro defects.    Assessment/Plan            Patient with a past medical history of Breast CA, HTN, HLD, CKD III, Papillary Thyroid CA s/p Hypothyroidism, CTS, Meningioma (MRI due in 2026), Osteoarthritis, Gout, Pedal edema, FAtty Liver, Mammogram (June)         # HLD  Stable  Continue current medications  Watch salt, avoid excessive caffeine  Avoid processed meats/ sugars/juices Instead eat fresh fruit  Add walnuts and almonds to your diet  exercise 6 days a week for 30 minutes        # HTN/ CKD III  Stable  Continue current medications     # OA of knees  Doing well with walker  Refer to Ortho for consideration for gel injection     # Hypothyroidism  Stable  Continue current medications        # Chronic Vertigo  Stable     # Meningioma  MRI is stable    # Hyperglycemia  Will check an A1c    Mammogram ordered

## 2025-06-25 LAB
EST. AVERAGE GLUCOSE BLD GHB EST-MCNC: 120 MG/DL
EST. AVERAGE GLUCOSE BLD GHB EST-SCNC: 6.6 MMOL/L
HBA1C MFR BLD: 5.8 %
TSH SERPL-ACNC: 1.36 MIU/L (ref 0.4–4.5)

## 2025-07-02 ENCOUNTER — OFFICE VISIT (OUTPATIENT)
Dept: NEUROLOGY | Facility: HOSPITAL | Age: 85
End: 2025-07-02
Payer: COMMERCIAL

## 2025-07-02 VITALS — SYSTOLIC BLOOD PRESSURE: 149 MMHG | HEART RATE: 76 BPM | DIASTOLIC BLOOD PRESSURE: 69 MMHG

## 2025-07-02 DIAGNOSIS — I13.0 HYPERTENSIVE HEART AND CHRONIC KIDNEY DISEASE WITH HEART FAILURE AND STAGE 1 THROUGH STAGE 4 CHRONIC KIDNEY DISEASE, OR UNSPECIFIED CHRONIC KIDNEY DISEASE: ICD-10-CM

## 2025-07-02 DIAGNOSIS — G44.81 HYPNIC HEADACHE: Primary | ICD-10-CM

## 2025-07-02 DIAGNOSIS — G44.86 CERVICOGENIC HEADACHE: ICD-10-CM

## 2025-07-02 RX ORDER — MELATONIN 5 MG
5 LOZENGE ORAL NIGHTLY
Qty: 60 LOZENGE | Refills: 0 | Status: SHIPPED | OUTPATIENT
Start: 2025-07-02 | End: 2025-08-31

## 2025-07-02 NOTE — PATIENT INSTRUCTIONS
Thank you for your visit.     We think you headache and facial and back of the ear pressure is due to a combination of Hypnic headache and head-ache due to arthritis in your cervical spine called a cervicogenic headache.   This can be improved with melatonin (which is a natural hormone) and physical therapy to help with neck stiffness.      neurology

## 2025-07-02 NOTE — PROGRESS NOTES
Chief Complaint: Headaches    History of Present Illness: Ana Hightower is a 84 y.o. female with a PMHx of breast cancer in remission, HTN, HLD, CKD stage 3, thyroid cancer s/p hypothyroidism, CTS, Meningioma, Osteoarthritis, Pedal edema, fatty liver, who presents to neurology resident clinic for hypnic headache follow up.     Meningioma history: Previously followed with Dr. Narciso Houser and Dr. Stevens. Diagnosed ~10 years ago after presenting with a headache. Headaches were never bothersome, occurred once every few months and went away without medications.  MRI Brain 02/2023 remarkable for non   specific white matter changes and left petrosal menigioma minimally enlarged compared to 04/2020    Headache History:  Established care in resident clinic in 11/2023 for worsening headaches. ESR/CRP obtained and were WNL     Headache started in 2022.  Occurs daily waking her up from her sleep ~3AM, and goes away after waking in the morning. Located in the left fronto/temporal radiates behind the ear and down the shoulder. Describes it as a throbbing at a 7/10 intensity. Denies associated scalp tenderness. Denies visual changes. Improves with Excedrin, which she takes ~1x/week.  Denies photophobia, phonophobia, aura, alcohol use, smoking. Does not affect her daily activities, not worse with positional changes. No triggers or other associated symptoms.     Sleeps from 8PM-11PM to 7AM most days. Wakes up frequently, and is only gettingn about 4 hours of sleep. Drinks 1 cup coffee/day.     Only uses nsaids for OA pain. No scalp tenderness on exam or vision changes. ESR/CRP WNL.     Funduscopic exam normal. Suspect etiology of headaches are 2/2 to hypnic headaches,     Patient was last seen on 4/24/25 by Dr. Villatoro: Headaches improved with cup of coffee before bed.     Previous workup:   MRI Brain w/wo 3/4/25:   Overall similar size and appearance of left petrous apex  meningioma with extension into Meckel's cave, the  cavernous sinus,  the internal auditory canal, the prepontine cistern, and the middle  cranial fossa on the left. Similar mild mass effect on the medial  aspect of the left temporal lobe. No new abnormal areas of  enhancement within the brain.     Interval History   Headaches have been going away and coming back - occasionally coming back but not as bad as they used to be. Says coffee used to help but says it kept her awake so she stopped drinking it, Said her left side of the face has unsual sensations/pains.  Sometimes triggered with getting less sleep or sleeping in certain positions. Says over the last couple weeks, almost every night she would wake up with left pressure over the left side of her face and temporal bone - not pain, says never goes over 3-4/10. Says she occasionally has nasl drainage from her left nostril,  standing up makes headache better. Does wake up with a headache. No dizziness, no hearing issues. Gets occasional vertigo fo rseveral years - not  bothered by it.     ROS: All systems reviewed and were negative except as above    Home Medications:    Current Outpatient Medications   Medication Instructions    amLODIPine (NORVASC) 10 mg, oral, Daily    cholecalciferol (Vitamin D-3) 50 MCG (2000 UT) tablet 1 tablet, oral, Daily    furosemide (LASIX) 20 mg, oral, Daily    levothyroxine (SYNTHROID, LEVOXYL) 125 mcg, oral, Daily    meclizine (ANTIVERT) 25 mg, oral, 3 times daily PRN    rosuvastatin (CRESTOR) 10 mg, oral, Daily    triamterene-hydrochlorothiazid (Maxzide) 75-50 mg tablet 1 tablet, oral, Daily       Past Medical History:    has a past medical history of Breast cancer (Multi), Encounter for screening for malignant neoplasm of colon (06/24/2016), Epistaxis (08/16/2019), Osteoarthritis of knee, unspecified, Other conditions influencing health status (09/12/2014), Pain in unspecified hand (10/27/2014), Personal history of irradiation, Personal history of malignant neoplasm of thyroid  (03/31/2014), Personal history of other diseases of the digestive system (05/01/2014), Personal history of other diseases of the nervous system and sense organs, Personal history of other specified conditions (05/10/2016), Trigger finger, unspecified finger (04/06/2015), and Unilateral primary osteoarthritis, right knee.    Past Surgical History:    has a past surgical history that includes Total thyroidectomy (07/30/2013); Knee surgery (07/30/2013); Hand tendon surgery (04/06/2015); Carpal tunnel release (04/06/2015); Total abdominal hysterectomy (03/31/2014); [place] lymph node biopsy (03/31/2014); Other surgical history (03/31/2014); Breast biopsy (Left, 06/18/2010); and Breast lumpectomy (Left, 07/01/2010).    Allergies:   No Known Allergies    Family History:   Family History   Problem Relation Name Age of Onset    Lung cancer Father      Ovarian cancer Sister      Pancreatic cancer Brother      Rectal cancer Son      Breast cancer Other maternal relatives        Past Social History:    reports that she has never smoked. She has never been exposed to tobacco smoke. She has never used smokeless tobacco. She reports that she does not drink alcohol and does not use drugs.    Vitals:   Heart Rate:  [76] 76  BP: (149)/(69) 149/69    Physical Exam:   General Appearance:  No distress, alert, interactive and cooperative.     Mental State: Orientation was normal to time, place and person. Recent and remote memory was intact.  Language testing was normal for comprehension, repetition, expression, and naming.     Cranial Nerves:   CN: Visual fields full to confrontation.   CN 3, 4, 6: Pupils round, 2 mm in diameter, equally reactive to light. Lids symmetric; no ptosis. EOMs normal alignment, full range with normal saccades, pursuit and convergence.   No nystagmus.   CN 5: Facial sensation intact bilaterally.   CN 7: Normal and symmetric facial strength. Nasolabial folds symmetric.   CN 8: Hearing intact to voice  CN 9:  Palate elevates symmetrically.   CN 11: Normal strength of shoulder shrug and neck turning.   CN 12: Tongue midline, with normal bulk and strength; no fasciculations.     Motor: Muscle bulk and tone were normal in both upper and lower extremities. No fasciculations, tremor or other abnormal movements were present.     Strength   R L  Deltoid  5- 5-  Biceps  5 5  Triceps  5 5      *limited by arthritis pain   Hip flexion 5 5  Quadriceps 5 5  Hamstrings 5 5  DorsiFlex 5          5  PlantarFlex 5 5    Reflexes: Right/ Left:  Biceps 2/2, brachioradialis 2/2, triceps 1/1,   Sensory: In both upper and lower extremities, sensation was intact to light touch    Coordination: In both upper extremities, finger-nose-finger was intact without dysmetria or overshoot. In both lower extremities, heel-to-shin was intact.     Gait: Station was stable but cautious. Assisted with walker. No ataxia, shuffling, steppage or waddling was present.     Assessment/Recommendations  Ana Hightower is a 84 y.o. female with a PMHx of breast cancer in remission, HTN, HLD, CKD stage 3, thyroid cancer s/p hypothyroidism, CTS, Meningioma, Osteoarthritis, Pedal edema, fatty liver, who presents to [facility] neurology clinic for headache follow up.  Neurological exam intact and unchanged.  Diagnosed with hypnic headaches during the last vision given nightly 3AM L frontotemporal [place]'s.  On last visit, pateints headache had resolved with caffine. She stopped taking caffeine due to inability to fall asleep. At thsi time, pateint said her pains have resolved bu she has a pressure like sensation along the frontotemporal bone and the area supplied by the greater occipital nerve, suspect these are due to cervicogenic headaches iso known arthritis. Patient presentation consistent with hypnic headache with possible component of cervicogenic headache from arthritis as well as weather changes. Lower suspicion that meningioma might be contributing to this.     Hypnic  headaches   - Patient offered melatonin - wants prescription, will give it a try. Wants to try to work on sleep hygiene for now. 5mg melatonin ordered at bedtime.     2. 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 Tobias

## 2025-07-03 PROBLEM — I13.0 HYPERTENSIVE HEART AND CHRONIC KIDNEY DISEASE WITH HEART FAILURE AND STAGE 1 THROUGH STAGE 4 CHRONIC KIDNEY DISEASE, OR UNSPECIFIED CHRONIC KIDNEY DISEASE: Status: ACTIVE | Noted: 2025-07-03

## 2025-07-17 ENCOUNTER — OFFICE VISIT (OUTPATIENT)
Dept: GASTROENTEROLOGY | Facility: HOSPITAL | Age: 85
End: 2025-07-17
Payer: COMMERCIAL

## 2025-07-17 VITALS
HEART RATE: 89 BPM | BODY MASS INDEX: 48.57 KG/M2 | DIASTOLIC BLOOD PRESSURE: 81 MMHG | SYSTOLIC BLOOD PRESSURE: 152 MMHG | OXYGEN SATURATION: 97 % | TEMPERATURE: 96.5 F | WEIGHT: 274.2 LBS

## 2025-07-17 DIAGNOSIS — K59.00 CONSTIPATION, UNSPECIFIED CONSTIPATION TYPE: Primary | ICD-10-CM

## 2025-07-17 DIAGNOSIS — R10.12 LEFT UPPER QUADRANT ABDOMINAL PAIN: ICD-10-CM

## 2025-07-17 PROCEDURE — 99212 OFFICE O/P EST SF 10 MIN: CPT | Performed by: STUDENT IN AN ORGANIZED HEALTH CARE EDUCATION/TRAINING PROGRAM

## 2025-07-17 RX ORDER — POLYETHYLENE GLYCOL 3350 17 G/17G
34 POWDER, FOR SOLUTION ORAL 2 TIMES DAILY
Qty: 120 PACKET | Refills: 11 | Status: SHIPPED | OUTPATIENT
Start: 2025-07-17 | End: 2026-07-17

## 2025-07-17 RX ORDER — DICYCLOMINE HYDROCHLORIDE 10 MG/1
10 CAPSULE ORAL 4 TIMES DAILY PRN
Qty: 60 CAPSULE | Refills: 11 | Status: SHIPPED | OUTPATIENT
Start: 2025-07-17 | End: 2026-07-17

## 2025-07-17 SDOH — ECONOMIC STABILITY: FOOD INSECURITY: WITHIN THE PAST 12 MONTHS, YOU WORRIED THAT YOUR FOOD WOULD RUN OUT BEFORE YOU GOT MONEY TO BUY MORE.: NEVER TRUE

## 2025-07-17 SDOH — ECONOMIC STABILITY: FOOD INSECURITY: WITHIN THE PAST 12 MONTHS, THE FOOD YOU BOUGHT JUST DIDN'T LAST AND YOU DIDN'T HAVE MONEY TO GET MORE.: NEVER TRUE

## 2025-07-17 ASSESSMENT — PAIN SCALES - GENERAL: PAINLEVEL_OUTOF10: 0-NO PAIN

## 2025-07-17 ASSESSMENT — PATIENT HEALTH QUESTIONNAIRE - PHQ9
2. FEELING DOWN, DEPRESSED OR HOPELESS: NOT AT ALL
1. LITTLE INTEREST OR PLEASURE IN DOING THINGS: NOT AT ALL
SUM OF ALL RESPONSES TO PHQ9 QUESTIONS 1 & 2: 0

## 2025-07-17 ASSESSMENT — LIFESTYLE VARIABLES
AUDIT-C TOTAL SCORE: 0
HOW MANY STANDARD DRINKS CONTAINING ALCOHOL DO YOU HAVE ON A TYPICAL DAY: PATIENT DOES NOT DRINK
HOW OFTEN DO YOU HAVE SIX OR MORE DRINKS ON ONE OCCASION: NEVER
SKIP TO QUESTIONS 9-10: 1
HOW OFTEN DO YOU HAVE A DRINK CONTAINING ALCOHOL: NEVER

## 2025-07-17 NOTE — PROGRESS NOTES
Gastroenterology Clinic Consult Note    Reason For Consult  Abdominal pain    History Of Present Illness  Ana Hightower is a 84 y.o. female with a past medical history of HTN, HLD, hypothyroidism, morbid obesity, fatty liver, breast cancer s/p partial mastectomy of L breast and s/p XRT in 2010 (ER+ve, LA -ve, Her 2 -ve, refused chemotherapy), gout, osteoarthritis, papillary thyroid cancer, fatty liver and CKD3 who presents to the GI clinic today to discuss abdominal pain.     Per patient, she started experiencing LUQ abdominal pain the last few years. She says the pain extends from below her left breast to her back and then radiates down to her L waist to the top of her L hip. This pain can last for several hours in a day. Pain can sometimes last for 2 to 3 weeks, then it can go away for a month and come back again. She describes the pain as dull, achy kind of pain. She says she does not have the pain now. She says Tums can make the pain better. She can't think of anything that worsens or triggers the pain.     She says she avoids dairy because it makes her bloated, so avoids milk and cheese. She does not have regular bowel movements. Says all her life, even when she was a young girl, she used to have bowel movements once every 3 days. She says often takes milk of magnesia. When she takes laxatives, she does not have to strain to have bowel movements, else she will need to strain to have bowel movements. She says she has tried miralax previously, but only took it daily for about 5 days. It did not help her much, so she stopped taking it. She has not tried taking double dose of miralax nor has she tried taking it BID or TID. Most times, she says she has CSBMs.     She denies any pain in the epigastric region. She denies any heartburn or acid regurgitation.     Patient had her last colonoscopy in Aug 2016 (excellent prep) and it was a normal colonoscopy. Patient had a CT a/p with IV contrast on 3/28/2025 that showed  some benign ill defined lesions in her liver (likely hemangiomas as noted on a prior Liver MRI from 2013) as well as her spleen. A US spleen was subsequently performed on 4/8/2025 during which these splenic lesions were not visualized.      Past Medical History  Medical History[1]    Surgical History  Surgical History[2]    Social History  Social Drivers of Health     Tobacco Use: Low Risk  (7/17/2025)    Patient History     Smoking Tobacco Use: Never     Smokeless Tobacco Use: Never     Passive Exposure: Never   Alcohol Use: Not At Risk (7/17/2025)    AUDIT-C     Frequency of Alcohol Consumption: Never     Average Number of Drinks: Patient does not drink     Frequency of Binge Drinking: Never   Financial Resource Strain: Not on File (8/25/2019)    Received from [facility]    Financial Resource Strain     Financial Resource Strain: 0   Food Insecurity: No Food Insecurity (7/17/2025)    Hunger Vital Sign     Worried About Running Out of Food in the Last Year: Never true     Ran Out of Food in the Last Year: Never true   Transportation Needs: Not on File (8/25/2019)    Received from [facility]    Transportation Needs   Physical Activity: Not on File (8/25/2019)    Received from [facility]    Physical Activity   Stress: Not on File (8/25/2019)    Received from [facility]    Stress   Social Connections: Not on File (8/25/2019)    Received from [facility]    Social Connections     Social Connections and Isolation: 0   Intimate Partner Violence: Not on file   Depression: Not at risk (7/17/2025)    PHQ-2     PHQ-2 Score: 0   Housing Stability: Not on File (8/25/2019)    Received from [facility]    Housing Stability   Utilities: Not on file   Digital Equity: Not on file   Health Literacy: Not on file     Family History  Family History[3]     Allergies  RX Allergies[4]    Home Medications  Current Medications[5]    Review of Systems  A 12 point ROS was performed and is negative except for HPI above.      Physical Exam  General: well-nourished, no acute  distress, obese body habitus  HEENT: No pallor, no icterus  Respiratory: CTAB  Cardiovascular: S1, S2 heard   Abdomen: Soft, nontender, nondistended, obese   Neuro: alert and oriented, CNII-XII grossly intact, moves all 4 extremities with no focal deficits     Last Recorded Vitals  Blood pressure 152/81, pulse 89, temperature 35.8 °C (96.5 °F), weight 124 kg (274 lb 3.2 oz), SpO2 97%.    Relevant Results  Reviewed.     Imaging:  US spleen 4/8/2025:  IMPRESSION:  1. No splenomegaly. Previously seen splenic lesions in CT scan could  not be visualized    CT a/p with IV contrast 3/28/2025:  IMPRESSION:  1. No acute process in the abdomen or pelvis.  2. Two hyperenhancing hepatic lesions favored to represent benign  hemangiomas, however MRI may be performed for more definitive  characterization if clinically warranted. Stable probable hepatic  cysts.  3. Similar likely benign ill-defined splenic lesions accounting for  differences in technique which can also be better assessed on MRI.    GI Procedures:  Colonoscopy 8/5/2016:  Impression:                                 - The entire examined colon is normal on direct and                          retroflexion views.                         - No specimens collected.     ASSESSMENT/PLAN:  Ana Hightower is a 84 y.o. female with a past medical history of HTN, HLD, hypothyroidism, morbid obesity, fatty liver, breast cancer s/p partial mastectomy of L breast and s/p XRT in 2010 (ER+ve, [place] -ve, Her 2 -ve, refused chemotherapy), gout, osteoarthritis, papillary thyroid cancer, fatty liver and CKD3 who presents to the GI clinic today to discuss abdominal pain.     Unclear cause of patient's LUQ abdominal pain. She has not had any surgeries other than a hysterectomy and L breast partial mastectomy. As such, this is unlikely to be adhesional pain. Some ill defined splenic lesions were noted on a recent CT a/p, however, these were not re-demonstrated on a splenic US.     It is  possible that her pain is related to her infrequent bowel movements. Both the  film and cross sectional images from her recent CT a/p from March 2025 show significant fecal loading/stool burden. Patient mentions that all her life, she has been having bowel movements once every 3 days. She has to take milk of magnesia in order to have bowel movements. Other Ddx could be gastritis or PUD (less likely as pain goes away for a month or more before coming back), as pain gets better with taking Tums.     RECS:  Will prescribe double dose of miralax to be taken BID or TID  Will give her Bentyl to take as needed when she gets abdominal pain  Will check H.pylori stool antigen   Depending on the results of the H.pylori stool antigen test, I will consider ordering a course of PPI for her     RTC as needed.     I spent 30 minutes in the professional and overall care of this patient.    The patient was seen and discussed with GI attending, Dr. Salbador Carmen.     Kailyn Oliver MD MPH   GI Fellow   [facility]        [1]   Past Medical History:  Diagnosis Date    Breast cancer     Encounter for screening for malignant neoplasm of colon 06/24/2016    Encounter for screening colonoscopy    Epistaxis 08/16/2019    Epistaxis, recurrent    Osteoarthritis of knee, unspecified     Osteoarthritis of knee    Other conditions influencing health status 09/12/2014    Normal colonoscopy    Pain in unspecified hand 10/27/2014    Hand pain    Personal history of irradiation     Personal history of malignant neoplasm of thyroid 03/31/2014    History of malignant neoplasm of thyroid    Personal history of other diseases of the digestive system 05/01/2014    History of angular cheilitis    Personal history of other diseases of the nervous system and sense organs     History of carpal tunnel syndrome    Personal history of other specified conditions 05/10/2016    History of insomnia    Trigger finger, unspecified finger  04/06/2015    Triggering of finger    Unilateral primary osteoarthritis, right knee     Primary osteoarthritis of right knee   [2]   Past Surgical History:  Procedure Laterality Date    BREAST BIOPSY Left 06/18/2010    BREAST LUMPECTOMY Left 07/01/2010    CARPAL TUNNEL RELEASE  04/06/2015    Neuroplasty Decompression Median Nerve At Carpal Tunnel    HAND TENDON SURGERY  04/06/2015    Hand Incision Tendon Sheath Of A Finger    KNEE SURGERY  07/30/2013    Knee Surgery    OTHER SURGICAL HISTORY  03/31/2014    Left Breast Partial Mastectomy    SENTINEL LYMPH NODE BIOPSY  03/31/2014    [facility] Lymph Node Biopsy    TOTAL ABDOMINAL HYSTERECTOMY  03/31/2014    Total Abdominal Hysterectomy    TOTAL THYROIDECTOMY  07/30/2013    Thyroid Surgery Total Thyroidectomy   [3]   Family History  Problem Relation Name Age of Onset    Lung cancer Father      Ovarian cancer Sister      Pancreatic cancer Brother      Rectal cancer Son      Breast cancer Other maternal relatives    [4] No Known Allergies  [5]   Current Outpatient Medications:     amLODIPine (Norvasc) 10 mg tablet, Take 1 tablet (10 mg) by mouth once daily., Disp: 90 tablet, Rfl: 2    cholecalciferol (Vitamin D-3) 50 MCG (2000 UT) tablet, Take 1 tablet (2,000 Units) by mouth once daily., Disp: , Rfl:     dicyclomine (Bentyl) 10 mg capsule, Take 1 capsule (10 mg) by mouth 4 times a day as needed (abdominal pain)., Disp: 60 capsule, Rfl: 11    furosemide (Lasix) 20 mg tablet, Take 1 tablet (20 mg) by mouth once daily., Disp: 90 tablet, Rfl: 0    levothyroxine (Synthroid, Levoxyl) 125 mcg tablet, Take 1 tablet (125 mcg) by mouth once daily., Disp: 90 tablet, Rfl: 2    meclizine (Antivert) 25 mg tablet, Take 1 tablet (25 mg) by mouth 3 times a day as needed for dizziness., Disp: , Rfl:     melatonin 5 mg lozenge, Take 5 mg by mouth once daily at bedtime., Disp: 60 lozenge, Rfl: 0    polyethylene glycol (Glycolax, Miralax) 17 gram packet, Take 34 g by mouth 2 times a day.,  Disp: 120 packet, Rfl: 11    rosuvastatin (Crestor) 10 mg tablet, Take 1 tablet (10 mg) by mouth once daily., Disp: 90 tablet, Rfl: 2    triamterene-hydrochlorothiazid (Maxzide) 75-50 mg tablet, Take 1 tablet by mouth once daily., Disp: 90 tablet, Rfl: 2

## 2025-07-17 NOTE — PATIENT INSTRUCTIONS
Thank you for coming to see us in the GI clinic today.    I am not entirely sure what it causing this on and off left sided upper abdominal pain that radiates to your left hip. It could be related to the fact that you are not moving your bowels on a regular basis.     I recommend that you stop taking the milk of magnesia and instead take a double dose of miralax twice a day. You can always back off if you start having excessive loose stools. I am also prescribing you a medication called Bentyl for abdominal cramping and this is to be taken as needed whenever you have the abdominal pain.     I would like you to submit a stool sample to test for something called H.pylori which is an organism that can cause ulcers and gastritis in the stomach.    Take care!

## 2025-08-13 DIAGNOSIS — I13.0 HYPERTENSIVE HEART AND CHRONIC KIDNEY DISEASE WITH HEART FAILURE AND STAGE 1 THROUGH STAGE 4 CHRONIC KIDNEY DISEASE, OR UNSPECIFIED CHRONIC KIDNEY DISEASE: Primary | ICD-10-CM

## 2025-09-04 ENCOUNTER — APPOINTMENT (OUTPATIENT)
Dept: OPHTHALMOLOGY | Facility: CLINIC | Age: 85
End: 2025-09-04
Payer: COMMERCIAL

## 2025-09-09 ENCOUNTER — APPOINTMENT (OUTPATIENT)
Dept: OPHTHALMOLOGY | Facility: CLINIC | Age: 85
End: 2025-09-09
Payer: COMMERCIAL

## 2025-09-29 ENCOUNTER — APPOINTMENT (OUTPATIENT)
Dept: PRIMARY CARE | Facility: CLINIC | Age: 85
End: 2025-09-29
Payer: COMMERCIAL